# Patient Record
Sex: FEMALE | Race: WHITE | NOT HISPANIC OR LATINO | Employment: FULL TIME | URBAN - METROPOLITAN AREA
[De-identification: names, ages, dates, MRNs, and addresses within clinical notes are randomized per-mention and may not be internally consistent; named-entity substitution may affect disease eponyms.]

---

## 2022-11-29 ENCOUNTER — TRANSFERRED RECORDS (OUTPATIENT)
Dept: HEALTH INFORMATION MANAGEMENT | Facility: CLINIC | Age: 32
End: 2022-11-29

## 2022-11-30 ENCOUNTER — MEDICAL CORRESPONDENCE (OUTPATIENT)
Dept: HEALTH INFORMATION MANAGEMENT | Facility: CLINIC | Age: 32
End: 2022-11-30

## 2022-12-08 ENCOUNTER — TRANSCRIBE ORDERS (OUTPATIENT)
Dept: MATERNAL FETAL MEDICINE | Facility: CLINIC | Age: 32
End: 2022-12-08

## 2022-12-08 DIAGNOSIS — O26.90 PREGNANCY RELATED CONDITION, ANTEPARTUM: Primary | ICD-10-CM

## 2023-01-27 ENCOUNTER — PRE VISIT (OUTPATIENT)
Dept: MATERNAL FETAL MEDICINE | Facility: HOSPITAL | Age: 33
End: 2023-01-27

## 2023-02-01 ENCOUNTER — OFFICE VISIT (OUTPATIENT)
Dept: MATERNAL FETAL MEDICINE | Facility: HOSPITAL | Age: 33
End: 2023-02-01
Attending: MIDWIFE
Payer: COMMERCIAL

## 2023-02-01 ENCOUNTER — ANCILLARY PROCEDURE (OUTPATIENT)
Dept: ULTRASOUND IMAGING | Facility: HOSPITAL | Age: 33
End: 2023-02-01
Attending: MIDWIFE
Payer: COMMERCIAL

## 2023-02-01 DIAGNOSIS — O26.90 PREGNANCY RELATED CONDITION, ANTEPARTUM: ICD-10-CM

## 2023-02-01 DIAGNOSIS — O99.212 MATERNAL OBESITY SYNDROME, ANTEPARTUM, SECOND TRIMESTER: Primary | ICD-10-CM

## 2023-02-01 PROCEDURE — 99207 PR NO CHARGE LOS: CPT | Performed by: OBSTETRICS & GYNECOLOGY

## 2023-02-01 PROCEDURE — 76811 OB US DETAILED SNGL FETUS: CPT | Mod: 26 | Performed by: OBSTETRICS & GYNECOLOGY

## 2023-02-01 PROCEDURE — 76811 OB US DETAILED SNGL FETUS: CPT

## 2023-02-01 NOTE — NURSING NOTE
Patient reports no pain, no contractions, leaking of fluid, or bleeding.  SBAR given to AMARA BURNHAM, see their note in Epic.

## 2023-02-01 NOTE — PROGRESS NOTES
Please see full imaging report from [facility] program under imaging tab.    Margie Munson MD  Maternal Fetal Medicine

## 2023-03-01 ENCOUNTER — ANCILLARY PROCEDURE (OUTPATIENT)
Dept: ULTRASOUND IMAGING | Facility: HOSPITAL | Age: 33
End: 2023-03-01
Attending: OBSTETRICS & GYNECOLOGY
Payer: COMMERCIAL

## 2023-03-01 ENCOUNTER — OFFICE VISIT (OUTPATIENT)
Dept: MATERNAL FETAL MEDICINE | Facility: HOSPITAL | Age: 33
End: 2023-03-01
Attending: OBSTETRICS & GYNECOLOGY
Payer: COMMERCIAL

## 2023-03-01 DIAGNOSIS — O99.212 MATERNAL OBESITY SYNDROME, ANTEPARTUM, SECOND TRIMESTER: ICD-10-CM

## 2023-03-01 DIAGNOSIS — O99.212 MATERNAL OBESITY SYNDROME, ANTEPARTUM, SECOND TRIMESTER: Primary | ICD-10-CM

## 2023-03-01 PROCEDURE — 99207 PR NO CHARGE LOS: CPT | Performed by: OBSTETRICS & GYNECOLOGY

## 2023-03-01 PROCEDURE — 76816 OB US FOLLOW-UP PER FETUS: CPT | Mod: 26 | Performed by: OBSTETRICS & GYNECOLOGY

## 2023-03-01 PROCEDURE — 76816 OB US FOLLOW-UP PER FETUS: CPT

## 2023-03-01 NOTE — PROGRESS NOTES
Please see full imaging report from [facility] program under imaging tab    Margie Munson MD  Maternal Fetal Medicine

## 2023-04-11 LAB
ABO (EXTERNAL): NORMAL
ABO (EXTERNAL): NORMAL
GROUP B STREPTOCOCCUS (EXTERNAL): NEGATIVE
HEMOGLOBIN (EXTERNAL): 11.8 G/DL (ref 11.7–15.5)
HEMOGLOBIN (EXTERNAL): 11.8 G/DL (ref 11.7–15.5)
HEPATITIS B SURFACE ANTIGEN (EXTERNAL): NONREACTIVE
HIV1+2 AB SERPL QL IA: NONREACTIVE
HIV1+2 AB SERPL QL IA: NONREACTIVE
PLATELET COUNT (EXTERNAL): 347 10E3/UL (ref 140–400)
RH (EXTERNAL): POSITIVE
RH (EXTERNAL): POSITIVE
RUBELLA ANTIBODY IGG (EXTERNAL): NORMAL

## 2023-04-12 ENCOUNTER — HOSPITAL ENCOUNTER (OUTPATIENT)
Facility: HOSPITAL | Age: 33
Discharge: HOME | End: 2023-04-12
Attending: ADVANCED PRACTICE MIDWIFE | Admitting: ADVANCED PRACTICE MIDWIFE
Payer: COMMERCIAL

## 2023-04-12 ENCOUNTER — HOSPITAL ENCOUNTER (OUTPATIENT)
Facility: HOSPITAL | Age: 33
End: 2023-04-12
Admitting: ADVANCED PRACTICE MIDWIFE
Payer: COMMERCIAL

## 2023-04-12 VITALS
DIASTOLIC BLOOD PRESSURE: 69 MMHG | RESPIRATION RATE: 18 BRPM | TEMPERATURE: 98 F | HEIGHT: 68 IN | BODY MASS INDEX: 44.41 KG/M2 | HEART RATE: 100 BPM | SYSTOLIC BLOOD PRESSURE: 131 MMHG | WEIGHT: 293 LBS

## 2023-04-12 PROBLEM — Z36.89 ENCOUNTER FOR TRIAGE IN PREGNANT PATIENT: Status: ACTIVE | Noted: 2023-04-12

## 2023-04-12 PROCEDURE — G0463 HOSPITAL OUTPT CLINIC VISIT: HCPCS

## 2023-04-12 RX ORDER — PRENATAL VIT/IRON FUM/FOLIC AC 27MG-0.8MG
1 TABLET ORAL DAILY
COMMUNITY
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
## 2023-04-12 NOTE — DISCHARGE INSTRUCTIONS
Discharge Instruction for Undelivered Patients    Call your provider if you notice:  Swelling in your face or increased swelling in your hands or legs.  Headaches that are not relieved by Tylenol (acetaminophen).  Changes in your vision (blurring: seeing spots or stars.)  Nausea (sick to your stomach) and vomiting (throwing up).   Weight gain of 5 pounds or more per week.  Heartburn that doesn't go away.  Signs of bladder infection: pain when you urinate (use the toilet), need to go more often and more urgently.  The bag of wilson (rupture of membranes) breaks, or you notice leaking in your underwear.  Bright red blood in your underwear.  Abdominal (lower belly) or stomach pain.  For first baby: Contractions (tightening) less than 5 minutes apart for one hour or more.  Second (plus) baby: Contractions (tightening) less than 10 minutes apart and getting stronger.  *If less than 34 weeks: Contractions (tightening) more than 6 times in one hour.  Increase or change in vaginal discharge (note the color and amount)      Follow-up:  Scheduled appointment at [facility].       Kick Counts  It s normal to worry about your baby s health. One way you can know your baby s doing well is to record the baby s movements once a day. This is called a kick count.   Remember to take your kick count records to all your appointments with your healthcare provider.  How to count kicks    Time how long it takes you to feel 10 kicks, flutters, swishes, or rolls. Ideally, you want to feel at least 10 movements in 2 hours. You will likely feel 10 movements in less time than that.  Starting at 28 weeks, count your baby's movements daily. Follow your healthcare provider's instructions for kick counting. Here are tips for counting kicks:  Choose a time when the baby is active, such as after a meal.   Sit comfortably or lie on your side.   The first time the baby moves, write down the time.   Count each movement until the baby has moved  10 times. This  can take from 20 minutes to 2 hours.   If you haven't felt 10 kicks by the end of the second hour, wait a few hours. Then try again.  Try to do it at the same time each day.  When to call your healthcare provider  Call your healthcare provider  right away if:  You do a couple sets of kick counts during the day and your baby moves fewer than 10 times in 2 hours.  Your baby moves much less often than on the days before.  You haven't felt your baby move all day.  [employer] last reviewed this educational content on 8/1/2020 2000-2022 [employer]. All rights reserved. This information is not intended as a substitute for professional medical care. Always follow your healthcare professional's instructions.

## 2023-04-12 NOTE — PROGRESS NOTES
Patient given discharge paperwork and instructed to follow up with PHCP at scheduled appointment. Pt instructed to return to hospital if any vaginal bleeding or leaking of fluids, decreased fetal movement, or contractions. Pt verbalizes understanding.

## 2023-04-12 NOTE — PROGRESS NOTES
Jean Claude Peña CNM called and notified of pt arrival and status. Orders to continue monitoring for 10 more minutes and then patient can be discharged home.

## 2023-05-21 ENCOUNTER — HEALTH MAINTENANCE LETTER (OUTPATIENT)
Age: 33
End: 2023-05-21

## 2023-06-06 LAB — GROUP B STREPTOCOCCUS (EXTERNAL): NEGATIVE

## 2023-06-27 ENCOUNTER — HOSPITAL ENCOUNTER (OUTPATIENT)
Facility: HOSPITAL | Age: 33
Discharge: HOME | End: 2023-06-27
Attending: ADVANCED PRACTICE MIDWIFE | Admitting: ADVANCED PRACTICE MIDWIFE
Payer: COMMERCIAL

## 2023-06-27 VITALS
WEIGHT: 293 LBS | BODY MASS INDEX: 44.41 KG/M2 | DIASTOLIC BLOOD PRESSURE: 80 MMHG | HEIGHT: 68 IN | TEMPERATURE: 98.2 F | RESPIRATION RATE: 18 BRPM | SYSTOLIC BLOOD PRESSURE: 140 MMHG

## 2023-06-27 DIAGNOSIS — Z36.89 ENCOUNTER FOR TRIAGE IN PREGNANT PATIENT: Primary | ICD-10-CM

## 2023-06-27 LAB
ALBUMIN MFR UR ELPH: <4 MG/DL
ALT SERPL W P-5'-P-CCNC: 12 U/L (ref 0–50)
AST SERPL W P-5'-P-CCNC: 19 U/L (ref 0–45)
CREAT SERPL-MCNC: 0.57 MG/DL (ref 0.51–0.95)
CREAT UR-MCNC: 33.8 MG/DL
ERYTHROCYTE [DISTWIDTH] IN BLOOD BY AUTOMATED COUNT: 13.9 % (ref 10–15)
GFR SERPL CREATININE-BSD FRML MDRD: >90 ML/MIN/1.73M2
HCT VFR BLD AUTO: 31 % (ref 35–47)
HGB BLD-MCNC: 10.3 G/DL (ref 11.7–15.7)
HOLD SPECIMEN: NORMAL
MCH RBC QN AUTO: 27 PG (ref 26.5–33)
MCHC RBC AUTO-ENTMCNC: 33.2 G/DL (ref 31.5–36.5)
MCV RBC AUTO: 81 FL (ref 78–100)
PLATELET # BLD AUTO: 260 10E3/UL (ref 150–450)
PROT/CREAT 24H UR: NORMAL MG/G{CREAT}
RBC # BLD AUTO: 3.81 10E6/UL (ref 3.8–5.2)
WBC # BLD AUTO: 10.2 10E3/UL (ref 4–11)

## 2023-06-27 PROCEDURE — 84156 ASSAY OF PROTEIN URINE: CPT | Performed by: ADVANCED PRACTICE MIDWIFE

## 2023-06-27 PROCEDURE — 36415 COLL VENOUS BLD VENIPUNCTURE: CPT | Performed by: ADVANCED PRACTICE MIDWIFE

## 2023-06-27 PROCEDURE — 84450 TRANSFERASE (AST) (SGOT): CPT | Performed by: ADVANCED PRACTICE MIDWIFE

## 2023-06-27 PROCEDURE — G0463 HOSPITAL OUTPT CLINIC VISIT: HCPCS

## 2023-06-27 PROCEDURE — 82565 ASSAY OF CREATININE: CPT | Performed by: ADVANCED PRACTICE MIDWIFE

## 2023-06-27 PROCEDURE — 85027 COMPLETE CBC AUTOMATED: CPT | Performed by: ADVANCED PRACTICE MIDWIFE

## 2023-06-27 PROCEDURE — 84460 ALANINE AMINO (ALT) (SGPT): CPT | Performed by: ADVANCED PRACTICE MIDWIFE

## 2023-06-27 RX ORDER — LIDOCAINE 40 MG/G
CREAM TOPICAL
Status: DISCONTINUED | OUTPATIENT
Start: 2023-06-27 | End: 2023-06-27 | Stop reason: HOSPADM

## 2023-06-27 ASSESSMENT — ACTIVITIES OF DAILY LIVING (ADL): ADLS_ACUITY_SCORE: 31

## 2023-06-27 NOTE — PROGRESS NOTES
Patient is agreeable to planned induction due to elevated BMI and likely gestational hypertension (elevated blood pressures 6/27 AM). Patient given option to return for blood pressure check in clinic, patient prefers to schedule induction and have the plan set up while in hospital today. Aracelis MONAE CNM, agreeable to this plan. Induction offered tonight or Thursday AM. Patient and spouse prefer to come Thursday. Induction scheduled with [facility]. Blood pressure cuff ordered and given to patient to monitor blood pressures until induction. RN reviewed education with patient and observed patient taking her own blood pressure. RN reviewed discharge instructions, who and when to call, and pre-eclampsia warning signs. Patient and spouse verbalized understanding. Discharged home 12

## 2023-06-27 NOTE — DISCHARGE INSTRUCTIONS
Discharge Instruction for Undelivered Patients      Call your provider if you notice:  Swelling in your face or increased swelling in your hands or legs.  Headaches that are not relieved by Tylenol (acetaminophen).  Changes in your vision (blurring: seeing spots or stars.)  Nausea (sick to your stomach) and vomiting (throwing up).   Weight gain of 5 pounds or more per week.  Heartburn that doesn't go away.  Signs of bladder infection: pain when you urinate (use the toilet), need to go more often and more urgently.  The bag of wilson (rupture of membranes) breaks, or you notice leaking in your underwear.  Bright red blood in your underwear.  Abdominal (lower belly) or stomach pain.  For first baby: Contractions (tightening) less than 5 minutes apart for one hour or more.  Second (plus) baby: Contractions (tightening) less than 10 minutes apart and getting stronger.  *If less than 34 weeks: Contractions (tightening) more than 6 times in one hour.  Increase or change in vaginal discharge (note the color and amount)      Follow-up:  Call [facility] at 6AM on Thursday 6/29 to ensure induction arrival time   Come to [facility] on Thursday 6/29 at 7:30AM for scheduled induction    Call Provider if Blood pressure is above 150/90 two times at least 15 minutes apart

## 2023-06-27 NOTE — PROGRESS NOTES
"Outpatient/Triage Note:    Patient Name:  Susanna Fraser  :      1990  MRN:      [id number]    Subjective:  Susanna Fraser is a 32 year old  at 39.0 weeks, who presented to [facility] for evaluation of elevated clinic BP. Denies leaking of fluid, bleeding, or changes in fetal movement. Does endorse mild headache last evening for which she took tylenol for.     Objective:  Vital signs: BP (!) 140/80   Temp 98.2  F (36.8  C) (Oral)   Resp 18   Ht 1.727 m (5' 8\")   Wt (!) 152 kg (335 lb)   LMP  (LMP Unknown)   BMI 50.94 kg/m    FHR: 1 (difficult to trace due to maternal body habitus)  Uterine contractions: irritability  NST reactive    Physical Exam: A&Ox3  Abdomen: SIUP, Vertex by Leopold's, abdomen non-tender  SVE: /-2 per R  Legs: +1 edema, moves freely    Results:  Recent Results (from the past 168 hour(s))   ALT   Result Value Ref Range Status    ALT 12 0 - 50 U/L Final   AST   Result Value Ref Range Status    AST 19 0 - 45 U/L Final   CBC with platelets   Result Value Ref Range Status    WBC Count 10.2 4.0 - 11.0 10e3/uL Final    RBC Count 3.81 3.80 - 5.20 10e6/uL Final    Hemoglobin 10.3 (L) 11.7 - 15.7 g/dL Final    Hematocrit 31.0 (L) 35.0 - 47.0 % Final    MCV 81 78 - 100 fL Final    MCH 27.0 26.5 - 33.0 pg Final    MCHC 33.2 31.5 - 36.5 g/dL Final    RDW 13.9 10.0 - 15.0 % Final    Platelet Count 260 150 - 450 10e3/uL Final   Creatinine   Result Value Ref Range Status    Creatinine 0.57 0.51 - 0.95 mg/dL Final    GFR Estimate >90 >60 mL/min/1.73m2 Final   Protein  random urine   Result Value Ref Range Status    Total Protein Urine mg/dL <4.0   mg/dL Final    Total Protein UR MG/MG CR   Final    Creatinine Urine mg/dL 33.8 mg/dL Final     *Note: Due to a large number of results and/or encounters for the requested time period, some results have not been displayed. A complete set of results can be found in Results Review.       Assessment:   @ 39w0d here for evaluation of " elevated clinic BP.     Plan:   -Discussed risks of HTN including rapid progression to severe range BP's. Discussed risk of stroke, seizure, fetal or maternal death. She expresses understanding to these risks.  -Declines recommendation to stay for IOL at this time. Agreeable to return for IOL so she can mentally prepare. Scheduled 6/29/23 at 0730 at [facility]. On call providers notified.   -HTN warning signs discussed.   -Sent home with BP cuff and to call with elevated BP's.       Provider: PRISCILA Leo CNM

## 2023-06-27 NOTE — PROGRESS NOTES
Data: [facility]: 2023  9:43 AM.  Reason for maternal/fetal assessment is elevated blood pressures. Patient reports elevated BP in clinic today, moderate edema in lower extremities and mild generalized edema, reports occasional headaches.  Patient is a .  Prenatal record reviewed. Pregnancy  has been complicated by has been uncomplicated.  Gestational Age 39w0d. VSS. Fetal movement present. Patient denies uterine contractions, leaking of vaginal fluid/rupture of membranes, visual disturbances, epigastric or RUQ pain. Support person is present.   Action: Verbal consent for EFM. Triage assessment completed. Bill of rights reviewed.  Response: Patient verbalized agreement with plan. Will contact Dr Aracelis Hernandes with update and further orders.

## 2023-07-01 ENCOUNTER — HOSPITAL ENCOUNTER (INPATIENT)
Facility: HOSPITAL | Age: 33
LOS: 2 days | Discharge: HOME | DRG: 833 | End: 2023-07-03
Attending: ADVANCED PRACTICE MIDWIFE | Admitting: ADVANCED PRACTICE MIDWIFE
Payer: COMMERCIAL

## 2023-07-01 PROBLEM — Z34.90 ENCOUNTER FOR INDUCTION OF LABOR: Status: ACTIVE | Noted: 2023-07-01

## 2023-07-01 LAB
ABO/RH(D): NORMAL
ALBUMIN MFR UR ELPH: 14.8 MG/DL
ALBUMIN SERPL BCG-MCNC: 3.4 G/DL (ref 3.5–5.2)
ALP SERPL-CCNC: 135 U/L (ref 35–104)
ALT SERPL W P-5'-P-CCNC: 12 U/L (ref 0–50)
ANION GAP SERPL CALCULATED.3IONS-SCNC: 11 MMOL/L (ref 7–15)
ANTIBODY SCREEN: NEGATIVE
AST SERPL W P-5'-P-CCNC: 19 U/L (ref 0–45)
BILIRUB SERPL-MCNC: <0.2 MG/DL
BUN SERPL-MCNC: 7 MG/DL (ref 6–20)
CALCIUM SERPL-MCNC: 9.7 MG/DL (ref 8.6–10)
CHLORIDE SERPL-SCNC: 106 MMOL/L (ref 98–107)
CREAT SERPL-MCNC: 0.59 MG/DL (ref 0.51–0.95)
CREAT UR-MCNC: 153.5 MG/DL
DEPRECATED HCO3 PLAS-SCNC: 21 MMOL/L (ref 22–29)
ERYTHROCYTE [DISTWIDTH] IN BLOOD BY AUTOMATED COUNT: 14 % (ref 10–15)
GFR SERPL CREATININE-BSD FRML MDRD: >90 ML/MIN/1.73M2
GLUCOSE SERPL-MCNC: 105 MG/DL (ref 70–99)
HCT VFR BLD AUTO: 31.1 % (ref 35–47)
HGB BLD-MCNC: 10.4 G/DL (ref 11.7–15.7)
HOLD SPECIMEN: NORMAL
MCH RBC QN AUTO: 27.2 PG (ref 26.5–33)
MCHC RBC AUTO-ENTMCNC: 33.4 G/DL (ref 31.5–36.5)
MCV RBC AUTO: 81 FL (ref 78–100)
PLATELET # BLD AUTO: 281 10E3/UL (ref 150–450)
POTASSIUM SERPL-SCNC: 4.3 MMOL/L (ref 3.4–5.3)
PROT SERPL-MCNC: 6.2 G/DL (ref 6.4–8.3)
PROT/CREAT 24H UR: 0.1 MG/MG CR (ref 0–0.2)
RBC # BLD AUTO: 3.83 10E6/UL (ref 3.8–5.2)
SODIUM SERPL-SCNC: 138 MMOL/L (ref 136–145)
SPECIMEN EXPIRATION DATE: NORMAL
WBC # BLD AUTO: 10.1 10E3/UL (ref 4–11)

## 2023-07-01 PROCEDURE — 86850 RBC ANTIBODY SCREEN: CPT | Performed by: ADVANCED PRACTICE MIDWIFE

## 2023-07-01 PROCEDURE — 80053 COMPREHEN METABOLIC PANEL: CPT | Performed by: ADVANCED PRACTICE MIDWIFE

## 2023-07-01 PROCEDURE — 250N000013 HC RX MED GY IP 250 OP 250 PS 637: Performed by: ADVANCED PRACTICE MIDWIFE

## 2023-07-01 PROCEDURE — 3E0P7GC INTRODUCTION OF OTHER THERAPEUTIC SUBSTANCE INTO FEMALE REPRODUCTIVE, VIA NATURAL OR ARTIFICIAL OPENING: ICD-10-PCS | Performed by: ADVANCED PRACTICE MIDWIFE

## 2023-07-01 PROCEDURE — 120N000001 HC R&B MED SURG/OB

## 2023-07-01 PROCEDURE — 36415 COLL VENOUS BLD VENIPUNCTURE: CPT | Performed by: ADVANCED PRACTICE MIDWIFE

## 2023-07-01 PROCEDURE — 86780 TREPONEMA PALLIDUM: CPT | Performed by: ADVANCED PRACTICE MIDWIFE

## 2023-07-01 PROCEDURE — 85027 COMPLETE CBC AUTOMATED: CPT | Performed by: ADVANCED PRACTICE MIDWIFE

## 2023-07-01 PROCEDURE — 86901 BLOOD TYPING SEROLOGIC RH(D): CPT | Performed by: ADVANCED PRACTICE MIDWIFE

## 2023-07-01 PROCEDURE — 84156 ASSAY OF PROTEIN URINE: CPT | Performed by: ADVANCED PRACTICE MIDWIFE

## 2023-07-01 RX ORDER — NALOXONE HYDROCHLORIDE 0.4 MG/ML
0.4 INJECTION, SOLUTION INTRAMUSCULAR; INTRAVENOUS; SUBCUTANEOUS
Status: DISCONTINUED | OUTPATIENT
Start: 2023-07-01 | End: 2023-07-03 | Stop reason: HOSPADM

## 2023-07-01 RX ORDER — FENTANYL CITRATE 50 UG/ML
50 INJECTION, SOLUTION INTRAMUSCULAR; INTRAVENOUS EVERY 30 MIN PRN
Status: DISCONTINUED | OUTPATIENT
Start: 2023-07-01 | End: 2023-07-03 | Stop reason: HOSPADM

## 2023-07-01 RX ORDER — NALOXONE HYDROCHLORIDE 0.4 MG/ML
0.2 INJECTION, SOLUTION INTRAMUSCULAR; INTRAVENOUS; SUBCUTANEOUS
Status: DISCONTINUED | OUTPATIENT
Start: 2023-07-01 | End: 2023-07-03 | Stop reason: HOSPADM

## 2023-07-01 RX ORDER — ACETAMINOPHEN 325 MG/1
650 TABLET ORAL EVERY 4 HOURS PRN
Status: DISCONTINUED | OUTPATIENT
Start: 2023-07-01 | End: 2023-07-03 | Stop reason: HOSPADM

## 2023-07-01 RX ORDER — OXYTOCIN/0.9 % SODIUM CHLORIDE 30/500 ML
340 PLASTIC BAG, INJECTION (ML) INTRAVENOUS CONTINUOUS PRN
Status: DISCONTINUED | OUTPATIENT
Start: 2023-07-01 | End: 2023-07-03 | Stop reason: HOSPADM

## 2023-07-01 RX ORDER — METOCLOPRAMIDE HYDROCHLORIDE 5 MG/ML
10 INJECTION INTRAMUSCULAR; INTRAVENOUS EVERY 6 HOURS PRN
Status: DISCONTINUED | OUTPATIENT
Start: 2023-07-01 | End: 2023-07-03 | Stop reason: HOSPADM

## 2023-07-01 RX ORDER — KETOROLAC TROMETHAMINE 30 MG/ML
30 INJECTION, SOLUTION INTRAMUSCULAR; INTRAVENOUS
Status: DISCONTINUED | OUTPATIENT
Start: 2023-07-01 | End: 2023-07-03 | Stop reason: HOSPADM

## 2023-07-01 RX ORDER — IBUPROFEN 800 MG/1
800 TABLET, FILM COATED ORAL
Status: DISCONTINUED | OUTPATIENT
Start: 2023-07-01 | End: 2023-07-03 | Stop reason: HOSPADM

## 2023-07-01 RX ORDER — PROCHLORPERAZINE 25 MG
25 SUPPOSITORY, RECTAL RECTAL EVERY 12 HOURS PRN
Status: DISCONTINUED | OUTPATIENT
Start: 2023-07-01 | End: 2023-07-03 | Stop reason: HOSPADM

## 2023-07-01 RX ORDER — OXYCODONE HYDROCHLORIDE 5 MG/1
5 TABLET ORAL
Status: DISCONTINUED | OUTPATIENT
Start: 2023-07-01 | End: 2023-07-03 | Stop reason: HOSPADM

## 2023-07-01 RX ORDER — MISOPROSTOL 100 UG/1
25 TABLET ORAL
Status: DISCONTINUED | OUTPATIENT
Start: 2023-07-01 | End: 2023-07-02

## 2023-07-01 RX ORDER — LABETALOL HYDROCHLORIDE 5 MG/ML
20-80 INJECTION, SOLUTION INTRAVENOUS EVERY 10 MIN PRN
Status: DISCONTINUED | OUTPATIENT
Start: 2023-07-01 | End: 2023-07-03 | Stop reason: HOSPADM

## 2023-07-01 RX ORDER — PROCHLORPERAZINE MALEATE 10 MG
10 TABLET ORAL EVERY 6 HOURS PRN
Status: DISCONTINUED | OUTPATIENT
Start: 2023-07-01 | End: 2023-07-03 | Stop reason: HOSPADM

## 2023-07-01 RX ORDER — MORPHINE SULFATE 10 MG/ML
10 INJECTION, SOLUTION INTRAMUSCULAR; INTRAVENOUS
Status: DISCONTINUED | OUTPATIENT
Start: 2023-07-01 | End: 2023-07-02

## 2023-07-01 RX ORDER — ONDANSETRON 2 MG/ML
4 INJECTION INTRAMUSCULAR; INTRAVENOUS EVERY 6 HOURS PRN
Status: DISCONTINUED | OUTPATIENT
Start: 2023-07-01 | End: 2023-07-03 | Stop reason: HOSPADM

## 2023-07-01 RX ORDER — ONDANSETRON 4 MG/1
4 TABLET, ORALLY DISINTEGRATING ORAL EVERY 6 HOURS PRN
Status: DISCONTINUED | OUTPATIENT
Start: 2023-07-01 | End: 2023-07-03 | Stop reason: HOSPADM

## 2023-07-01 RX ORDER — METOCLOPRAMIDE 10 MG/1
10 TABLET ORAL EVERY 6 HOURS PRN
Status: DISCONTINUED | OUTPATIENT
Start: 2023-07-01 | End: 2023-07-03 | Stop reason: HOSPADM

## 2023-07-01 RX ORDER — MISOPROSTOL 200 UG/1
400 TABLET ORAL
Status: DISCONTINUED | OUTPATIENT
Start: 2023-07-01 | End: 2023-07-03 | Stop reason: HOSPADM

## 2023-07-01 RX ORDER — OXYTOCIN 10 [USP'U]/ML
10 INJECTION, SOLUTION INTRAMUSCULAR; INTRAVENOUS
Status: DISCONTINUED | OUTPATIENT
Start: 2023-07-01 | End: 2023-07-03 | Stop reason: HOSPADM

## 2023-07-01 RX ORDER — HYDRALAZINE HYDROCHLORIDE 20 MG/ML
10 INJECTION INTRAMUSCULAR; INTRAVENOUS
Status: DISCONTINUED | OUTPATIENT
Start: 2023-07-01 | End: 2023-07-03 | Stop reason: HOSPADM

## 2023-07-01 RX ORDER — OXYTOCIN/0.9 % SODIUM CHLORIDE 30/500 ML
100-340 PLASTIC BAG, INJECTION (ML) INTRAVENOUS CONTINUOUS PRN
Status: DISCONTINUED | OUTPATIENT
Start: 2023-07-01 | End: 2023-07-03 | Stop reason: HOSPADM

## 2023-07-01 RX ORDER — METHYLERGONOVINE MALEATE 0.2 MG/ML
200 INJECTION INTRAVENOUS
Status: DISCONTINUED | OUTPATIENT
Start: 2023-07-01 | End: 2023-07-03 | Stop reason: HOSPADM

## 2023-07-01 RX ORDER — CARBOPROST TROMETHAMINE 250 UG/ML
250 INJECTION, SOLUTION INTRAMUSCULAR
Status: DISCONTINUED | OUTPATIENT
Start: 2023-07-01 | End: 2023-07-03 | Stop reason: HOSPADM

## 2023-07-01 RX ORDER — CITRIC ACID/SODIUM CITRATE 334-500MG
30 SOLUTION, ORAL ORAL
Status: DISCONTINUED | OUTPATIENT
Start: 2023-07-01 | End: 2023-07-03 | Stop reason: HOSPADM

## 2023-07-01 RX ORDER — MISOPROSTOL 200 UG/1
800 TABLET ORAL
Status: DISCONTINUED | OUTPATIENT
Start: 2023-07-01 | End: 2023-07-03 | Stop reason: HOSPADM

## 2023-07-01 RX ORDER — HYDROXYZINE HYDROCHLORIDE 50 MG/1
50 TABLET, FILM COATED ORAL
Status: DISCONTINUED | OUTPATIENT
Start: 2023-07-01 | End: 2023-07-02

## 2023-07-01 RX ORDER — TERBUTALINE SULFATE 1 MG/ML
0.25 INJECTION, SOLUTION SUBCUTANEOUS
Status: DISCONTINUED | OUTPATIENT
Start: 2023-07-01 | End: 2023-07-02

## 2023-07-01 RX ADMIN — Medication 25 MCG: at 18:32

## 2023-07-01 RX ADMIN — DINOPROSTONE 10 MG: 10 INSERT VAGINAL at 20:46

## 2023-07-01 RX ADMIN — Medication 25 MCG: at 16:33

## 2023-07-01 ASSESSMENT — ACTIVITIES OF DAILY LIVING (ADL)
DIFFICULTY_EATING/SWALLOWING: NO
TOILETING_ISSUES: NO
CONCENTRATING,_REMEMBERING_OR_MAKING_DECISIONS_DIFFICULTY: NO
ADLS_ACUITY_SCORE: 20
WALKING_OR_CLIMBING_STAIRS_DIFFICULTY: NO
DRESSING/BATHING_DIFFICULTY: NO
ADLS_ACUITY_SCORE: 20
DOING_ERRANDS_INDEPENDENTLY_DIFFICULTY: NO
VISION_MANAGEMENT: CONTACTS
CHANGE_IN_FUNCTIONAL_STATUS_SINCE_ONSET_OF_CURRENT_ILLNESS/INJURY: NO
ADLS_ACUITY_SCORE: 20
ADLS_ACUITY_SCORE: 20
ADLS_ACUITY_SCORE: 35
FALL_HISTORY_WITHIN_LAST_SIX_MONTHS: NO
WEAR_GLASSES_OR_BLIND: YES

## 2023-07-01 NOTE — PROGRESS NOTES
Data: Patient presented to [facility]: 2023  3:01 PM.  Patient admitted for induction for gestational hypertension and elevated BMI. Patient is a .  Prenatal record reviewed. Pregnancy  has been complicated by gestational hypertension.  Gestational Age 39w4d. VSS. Fetal movement present. Patient denies uterine contractions, leaking of vaginal fluid/rupture of membranes, vaginal bleeding, headache, visual disturbances, epigastric or URQ pain. Support person is present.   Action: Verbal consent for EFM.  Admission assessment completed. Bill of rights reviewed.  Response: Patient verbalized agreement with plan. Will contact Dr Jean Claude Peña with update and further orders.

## 2023-07-01 NOTE — H&P
"HISTORY AND PHYSICAL UPDATE ADMISSION EXAM    Name: Susanna Fraser  YOB: 1990  Medical Record Number: [id number]    History of Present Illness: Susanna Fraser is a 32 year old female who is 39w4d pregnant and being admitted for induction of labor for GHTN and elevated BMI. She has had early and consistent prenatal care. Reassuring weekly  testing since 34 weeks. EFW at 36 weeks was in the 84% at 7-0 lbs. She has GHTN with BP elevations in the late third trimester, labs have been normal.   Estimated Date of Delivery: 2023    EGA 39w4d    OB History    Para Term  AB Living   1 0 0 0 0 0   SAB IAB Ectopic Multiple Live Births   0 0 0 0 0      # Outcome Date GA Lbr Deonte/2nd Weight Sex Delivery Anes PTL Lv   1 Current                 Lab Results   Component Value Date    HGB 10.3 (L) 2023       Prenatal Complications:  1. BMI 47  2. GHTN  3. Hx of depression; un-medicated and stable  4. Prominent fetal renal pelvis R 7.8, L 7.3    Exam:      /79   Temp 99.6  F (37.6  C) (Oral)   Resp 18   Ht 1.727 m (5' 8\")   Wt (!) 152 kg (335 lb)   LMP  (LMP Unknown)   BMI 50.94 kg/m      Fetal heart Rate Category1  Contractions rare    HEENT grossly normal  Neck: no lymphadenopathy or thryoidomegaly    ABD gravid, non-tender  EXT:  moves freely  Vaginal exam /- per RN  Membranes: intact    Assessment: @39.4 weeks with IOL for BMI and GHTN    Plan: Admit for cervical ripening  GHTN order set placed and labs ordered  Sleep medication if requested overnight  Anticipate NSVB    Prenatal record reviewed.    Dr. BISWAS aware of patient status and remains available for consultation and collaboration as needed.      Jean Claude Peña CNM      2023   4:26 PM  "

## 2023-07-02 LAB — T PALLIDUM AB SER QL: NONREACTIVE

## 2023-07-02 PROCEDURE — 120N000001 HC R&B MED SURG/OB

## 2023-07-02 PROCEDURE — 258N000003 HC RX IP 258 OP 636: Performed by: ADVANCED PRACTICE MIDWIFE

## 2023-07-02 PROCEDURE — 3E033VJ INTRODUCTION OF OTHER HORMONE INTO PERIPHERAL VEIN, PERCUTANEOUS APPROACH: ICD-10-PCS | Performed by: ADVANCED PRACTICE MIDWIFE

## 2023-07-02 PROCEDURE — 250N000013 HC RX MED GY IP 250 OP 250 PS 637: Performed by: ADVANCED PRACTICE MIDWIFE

## 2023-07-02 PROCEDURE — 250N000009 HC RX 250: Performed by: ADVANCED PRACTICE MIDWIFE

## 2023-07-02 RX ORDER — OXYTOCIN/0.9 % SODIUM CHLORIDE 30/500 ML
1-24 PLASTIC BAG, INJECTION (ML) INTRAVENOUS CONTINUOUS
Status: DISCONTINUED | OUTPATIENT
Start: 2023-07-02 | End: 2023-07-02

## 2023-07-02 RX ORDER — TERBUTALINE SULFATE 1 MG/ML
0.25 INJECTION, SOLUTION SUBCUTANEOUS
Status: DISCONTINUED | OUTPATIENT
Start: 2023-07-02 | End: 2023-07-03 | Stop reason: HOSPADM

## 2023-07-02 RX ORDER — LIDOCAINE 40 MG/G
CREAM TOPICAL
Status: DISCONTINUED | OUTPATIENT
Start: 2023-07-02 | End: 2023-07-02

## 2023-07-02 RX ORDER — TERBUTALINE SULFATE 1 MG/ML
0.25 INJECTION, SOLUTION SUBCUTANEOUS
Status: DISCONTINUED | OUTPATIENT
Start: 2023-07-02 | End: 2023-07-02

## 2023-07-02 RX ORDER — SODIUM CHLORIDE, SODIUM LACTATE, POTASSIUM CHLORIDE, CALCIUM CHLORIDE 600; 310; 30; 20 MG/100ML; MG/100ML; MG/100ML; MG/100ML
INJECTION, SOLUTION INTRAVENOUS CONTINUOUS PRN
Status: DISCONTINUED | OUTPATIENT
Start: 2023-07-02 | End: 2023-07-02

## 2023-07-02 RX ORDER — HYDROXYZINE HYDROCHLORIDE 50 MG/1
100 TABLET, FILM COATED ORAL
Status: DISCONTINUED | OUTPATIENT
Start: 2023-07-02 | End: 2023-07-03 | Stop reason: HOSPADM

## 2023-07-02 RX ORDER — MORPHINE SULFATE 10 MG/ML
10 INJECTION, SOLUTION INTRAMUSCULAR; INTRAVENOUS
Status: DISCONTINUED | OUTPATIENT
Start: 2023-07-02 | End: 2023-07-03 | Stop reason: HOSPADM

## 2023-07-02 RX ADMIN — SODIUM CHLORIDE, POTASSIUM CHLORIDE, SODIUM LACTATE AND CALCIUM CHLORIDE: 600; 310; 30; 20 INJECTION, SOLUTION INTRAVENOUS at 09:45

## 2023-07-02 RX ADMIN — HYDROXYZINE HYDROCHLORIDE 100 MG: 50 TABLET ORAL at 22:05

## 2023-07-02 RX ADMIN — Medication 2 MILLI-UNITS/MIN: at 09:46

## 2023-07-02 ASSESSMENT — ACTIVITIES OF DAILY LIVING (ADL)
ADLS_ACUITY_SCORE: 20

## 2023-07-02 NOTE — PROGRESS NOTES
Cook catheter placed by Brunilda Plaza CNM 80 ml in uterine and 80 ml in vaginal balloon. Per CHRISTIANO, ok to keep pitocin at 16mu/min and continue to go up to 24 mu/min

## 2023-07-02 NOTE — PROGRESS NOTES
Cervidil placed at 2046 following informed consent. SVE per flowsheet. Pt educated on cervidil and to remain lying in comfortable position for 2 hours prior to ambulating and voiding. Writer educated pt on IV access indications, pt agreeable to IV placement. Hot compress applied to left arm.

## 2023-07-02 NOTE — PLAN OF CARE
"BPs elevated but below treatable range; afebrile. Able to sleep overnight. Patient reports \" a couple\" painful contractions overnight.  at bedside and supportive. Cervidil to be removed this AM. Blanca batista in place for fetal monitoring; broken tracing at time d/t maternal positioning.   Problem: Labor  Goal: Hemostasis  Outcome: Progressing  Goal: Stable Fetal Wellbeing  Outcome: Progressing  Intervention: Promote and Monitor Fetal Wellbeing    Goal: Effective Progression to Delivery  Outcome: Progressing  Goal: Absence of Infection Signs and Symptoms  Outcome: Progressing  Goal: Acceptable Pain Control  Outcome: Progressing  Goal: Normal Uterine Contraction Pattern  Outcome: Progressing                           "

## 2023-07-02 NOTE — PROGRESS NOTES
Cervidil removed at 0840, SVE by RN 1/60/-1, Angel score of 8    Brunilda Plaza CNM at bedside to discuss next steps in induction with patient. Plan to start IV oxytocin infusion 1 hour after cervidil removal. Patient in agreement with plan.

## 2023-07-02 NOTE — PROGRESS NOTES
Writer had previously spoken with Mita Wong Labor Charge nurse and updated her that cook catheter was placed and okay to go up to 24mu/min.  Writer has seen lower max amounts when used for cervical ripening in other patient care situations.  Questioned to charge if this was okay per policy to go up to 24mu/min. Charge nurse was looking into it and spoke with Dr. Kelly, the in house OB. Mita updated writer that she had spoken to Vane Plaza and unit is not feeling comfortable going up to 24 mu/min.  Order given to change pitocin to 8mu/min.

## 2023-07-02 NOTE — PROGRESS NOTES
Oxytocin infusion started at 0946, triturating per protocol. CNM at bedside at 1425 to assess labor. SVE 2/70/-2. Oxytocin infusion at 16 miliunits. Plan to continue current management at this time. CNM available for AROM when patient desires

## 2023-07-02 NOTE — PROGRESS NOTES
Patient would prefer trying the cook catheter prior to AROM due to risk of infection for baby. Brunilda updated, planning to come to hospital to place cook catheter

## 2023-07-02 NOTE — PROGRESS NOTES
Per pt request, RN called Jean Claude Peña CNM requesting cervidil order for 2030. CNM to place order. Provider updated on BPs since admission, elevated but not treatable in severe range.

## 2023-07-02 NOTE — PROGRESS NOTES
"Patient Name:  Susanna Fraser  :      1990  MRN:      [id number]    Assessment:     at 39w5d  IOL labor  Category 1 FHTs  Intact  Angel 8      Plan:   -Discussed R/B/A of pitocin  -Discussed next intervention AROM if needed  -Reviewed internal monitors as needed  -Reviewed birth plan  -Routine support & management. Encourage position changes, ambulation as appropriate, rest as desired.  -Anticipate progress and NSVB.   -Reevaluate progress in 8 hours or sooner with a change in status.     Subjective:  Susanna Fraser is coping well with contractions, denies feeling anything. Good PO fluid intake.   Voiding without issue. Family supportive at bedside. Discussion of Pitocin and next steps. Not planning pain medication but aware of her options. Has contacted leeanna.       Objective:  /67   Temp 97.8  F (36.6  C) (Oral)   Resp 18   Ht 1.727 m (5' 8\")   Wt (!) 152 kg (335 lb)   LMP  (LMP Unknown)   BMI 50.94 kg/m      FHR:Baseline: 135 bpm, Variability: Moderate (6 - 25 bpm), Accelerations: present and Decelerations:occasional variable, using Blanca monitor    Uterine contractions:TocoFrequency: occasionally, palpating mild  SVE:/-2 per RN    Provider: PRISCILA Gross CNM      Date:  2023  Time:  9:36 AM    "

## 2023-07-02 NOTE — PLAN OF CARE
Cervidil in place. EFM: Cat 1 tracing +accels. Contractions are occasional, nonpalpable. Susanna is resting comfortably with peanut ball and changing her position independently. RN available for labor support. Continuing with current POC.     Problem: Labor  Goal: Hemostasis  Outcome: Progressing  Goal: Stable Fetal Wellbeing  Outcome: Progressing  Goal: Effective Progression to Delivery  Outcome: Progressing  Goal: Absence of Infection Signs and Symptoms  Outcome: Progressing  Goal: Acceptable Pain Control  Outcome: Progressing  Goal: Normal Uterine Contraction Pattern  Outcome: Progressing   Goal Outcome Evaluation:      Plan of Care Reviewed With: patient, spouse    Overall Patient Progress: improvingOverall Patient Progress: improving

## 2023-07-02 NOTE — PROGRESS NOTES
"Patient Name:  Susanna Fraser  :      1990  MRN:      [id number]    Assessment:     at 39w5d  IOL labor  Category 1 FHTs  intact      Plan:   -Discussed AROM for next intervention, she initially was agreeable in a couple hours then changed her mind and really wanted to try a cook cath, reviewed no need for continued ripening but she would really prefer the cook cath over AROM at this time  -Reviewed Cook cath with pitocin higher than 8mu policy and with Dr Solis, not contraindicated  -Will turn pitocin to 8mu as RN not comfortable increasing outside of usual plan  -Discussed with pt the indications for, risks, benefits and alternatives of cook catheter placement for cervical ripening. Informed verbal consent obtained. Pt placed in the lithotomy position. Digital exam completed, cervix was easily palpated. . Cook catheter, with stylet, inserted into the vagina and traversed through the cervix until the uterine balloon was above the level of the internal os. Stylet removed and catheter advanced until both balloons entered the cervical canal. 40 mL of saline instilled into the uterine balloon. Catheter was gently pulled back until the uterine balloon abutted the internal os. An additional 40 mL of saline instilled for a total of 80 mL in the uterine balloon only.  80mls in vaginal balloon. Pt tolerated procedure well. Continue low dose pitocin    -Routine support & management. Encourage position changes, ambulation as appropriate, rest as desired.  -Anticipate progress and NSVB.   -Reevaluate progress in 12 hours or sooner with a change in status.     Subjective:  Susanna Fraser is coping well with contractions. Using non pharmacologic  for pain relief. Good PO fluid intake.   Voiding without issue. Family supportive at bedside.       Objective:  /78   Temp 97.7  F (36.5  C) (Oral)   Resp 20   Ht 1.727 m (5' 8\")   Wt (!) 152 kg (335 lb)   LMP  (LMP Unknown)   BMI 50.94 kg/m  "     FHR:Baseline: 135 bpm, Variability: Moderate (6 - 25 bpm), Accelerations: present and Decelerations: Variable x1    Uterine contractions:TocoFrequency: Every 1-2 minutes, Duration: 60 seconds and Intensity: mild    SVE:2/70/-2, soft, midposition    Provider: PRISCILA Gross CNM      Date:  7/2/2023  Time:  4:45 PM

## 2023-07-03 VITALS
DIASTOLIC BLOOD PRESSURE: 73 MMHG | TEMPERATURE: 98.8 F | WEIGHT: 293 LBS | RESPIRATION RATE: 18 BRPM | BODY MASS INDEX: 44.41 KG/M2 | HEIGHT: 68 IN | SYSTOLIC BLOOD PRESSURE: 121 MMHG

## 2023-07-03 LAB
ALBUMIN MFR UR ELPH: 29.5 MG/DL
ALT SERPL W P-5'-P-CCNC: 14 U/L (ref 0–50)
AST SERPL W P-5'-P-CCNC: 25 U/L (ref 0–45)
BASOPHILS # BLD AUTO: 0 10E3/UL (ref 0–0.2)
BASOPHILS NFR BLD AUTO: 0 %
CREAT SERPL-MCNC: 0.63 MG/DL (ref 0.51–0.95)
CREAT UR-MCNC: 211.6 MG/DL
EOSINOPHIL # BLD AUTO: 0.1 10E3/UL (ref 0–0.7)
EOSINOPHIL NFR BLD AUTO: 1 %
ERYTHROCYTE [DISTWIDTH] IN BLOOD BY AUTOMATED COUNT: 13.9 % (ref 10–15)
GFR SERPL CREATININE-BSD FRML MDRD: >90 ML/MIN/1.73M2
HCT VFR BLD AUTO: 31.4 % (ref 35–47)
HGB BLD-MCNC: 10.5 G/DL (ref 11.7–15.7)
HOLD SPECIMEN: NORMAL
HOLD SPECIMEN: NORMAL
IMM GRANULOCYTES # BLD: 0.1 10E3/UL
IMM GRANULOCYTES NFR BLD: 1 %
LYMPHOCYTES # BLD AUTO: 2 10E3/UL (ref 0.8–5.3)
LYMPHOCYTES NFR BLD AUTO: 18 %
MCH RBC QN AUTO: 27.3 PG (ref 26.5–33)
MCHC RBC AUTO-ENTMCNC: 33.4 G/DL (ref 31.5–36.5)
MCV RBC AUTO: 82 FL (ref 78–100)
MONOCYTES # BLD AUTO: 0.7 10E3/UL (ref 0–1.3)
MONOCYTES NFR BLD AUTO: 6 %
NEUTROPHILS # BLD AUTO: 8.4 10E3/UL (ref 1.6–8.3)
NEUTROPHILS NFR BLD AUTO: 74 %
NRBC # BLD AUTO: 0 10E3/UL
NRBC BLD AUTO-RTO: 0 /100
PLATELET # BLD AUTO: 277 10E3/UL (ref 150–450)
PROT/CREAT 24H UR: 0.14 MG/MG CR (ref 0–0.2)
RBC # BLD AUTO: 3.85 10E6/UL (ref 3.8–5.2)
WBC # BLD AUTO: 11.4 10E3/UL (ref 4–11)

## 2023-07-03 PROCEDURE — 36415 COLL VENOUS BLD VENIPUNCTURE: CPT | Performed by: ADVANCED PRACTICE MIDWIFE

## 2023-07-03 PROCEDURE — 82565 ASSAY OF CREATININE: CPT | Performed by: ADVANCED PRACTICE MIDWIFE

## 2023-07-03 PROCEDURE — 258N000003 HC RX IP 258 OP 636: Performed by: ADVANCED PRACTICE MIDWIFE

## 2023-07-03 PROCEDURE — 250N000013 HC RX MED GY IP 250 OP 250 PS 637: Performed by: ADVANCED PRACTICE MIDWIFE

## 2023-07-03 PROCEDURE — 84156 ASSAY OF PROTEIN URINE: CPT | Performed by: ADVANCED PRACTICE MIDWIFE

## 2023-07-03 PROCEDURE — 85025 COMPLETE CBC W/AUTO DIFF WBC: CPT | Performed by: ADVANCED PRACTICE MIDWIFE

## 2023-07-03 PROCEDURE — 84450 TRANSFERASE (AST) (SGOT): CPT | Performed by: ADVANCED PRACTICE MIDWIFE

## 2023-07-03 PROCEDURE — 84460 ALANINE AMINO (ALT) (SGPT): CPT | Performed by: ADVANCED PRACTICE MIDWIFE

## 2023-07-03 PROCEDURE — 250N000009 HC RX 250: Performed by: ADVANCED PRACTICE MIDWIFE

## 2023-07-03 RX ORDER — OXYTOCIN/0.9 % SODIUM CHLORIDE 30/500 ML
1-24 PLASTIC BAG, INJECTION (ML) INTRAVENOUS CONTINUOUS
Status: DISCONTINUED | OUTPATIENT
Start: 2023-07-03 | End: 2023-07-03 | Stop reason: HOSPADM

## 2023-07-03 RX ORDER — SODIUM CHLORIDE, SODIUM LACTATE, POTASSIUM CHLORIDE, CALCIUM CHLORIDE 600; 310; 30; 20 MG/100ML; MG/100ML; MG/100ML; MG/100ML
INJECTION, SOLUTION INTRAVENOUS CONTINUOUS PRN
Status: DISCONTINUED | OUTPATIENT
Start: 2023-07-03 | End: 2023-07-03 | Stop reason: HOSPADM

## 2023-07-03 RX ORDER — TERBUTALINE SULFATE 1 MG/ML
0.25 INJECTION, SOLUTION SUBCUTANEOUS
Status: DISCONTINUED | OUTPATIENT
Start: 2023-07-03 | End: 2023-07-03 | Stop reason: HOSPADM

## 2023-07-03 RX ORDER — HYDROXYZINE HYDROCHLORIDE 50 MG/1
100 TABLET, FILM COATED ORAL ONCE
Status: COMPLETED | OUTPATIENT
Start: 2023-07-03 | End: 2023-07-03

## 2023-07-03 RX ORDER — LIDOCAINE 40 MG/G
CREAM TOPICAL
Status: DISCONTINUED | OUTPATIENT
Start: 2023-07-03 | End: 2023-07-03 | Stop reason: HOSPADM

## 2023-07-03 RX ADMIN — Medication 2 MILLI-UNITS/MIN: at 09:07

## 2023-07-03 RX ADMIN — HYDROXYZINE HYDROCHLORIDE 100 MG: 50 TABLET ORAL at 19:58

## 2023-07-03 RX ADMIN — SODIUM CHLORIDE, POTASSIUM CHLORIDE, SODIUM LACTATE AND CALCIUM CHLORIDE: 600; 310; 30; 20 INJECTION, SOLUTION INTRAVENOUS at 09:06

## 2023-07-03 ASSESSMENT — ACTIVITIES OF DAILY LIVING (ADL)
ADLS_ACUITY_SCORE: 20

## 2023-07-03 NOTE — PROGRESS NOTES
Vane Plaza updated there have for sure been 4 variables on the terrence monitor since 2030. Pt should not be semi-fowlers for long. She needs a tilt or to be on the side otherwise variable decelerations are occurring. Plan at this time is to keep Cook catheter in right now

## 2023-07-03 NOTE — PROGRESS NOTES
Per discussion with patient,  and  in room, patient is expressing her wishes when she goes to bed to only do cook catheter and turn off the pitocin for overnight.  She is worried about pitocin saturation and would like to sleep to get ready for tomorrow. Would like to potentially shower in the morning and start pitocin again around 8-9am .  Patient educated on risks/benefits of doing this. Patient would like to stick with plan.  Vane Plaza CNM updated and Vane will put orders in for this plan.  Patient okay to shower in the morning as long as EFM monitoring is category 1 . Updated that EFM tracing has had intermittent minimal variability, and have seen a couple of variables this shift. Moderate variability currently. Updated on contraction frequency.

## 2023-07-03 NOTE — PLAN OF CARE
Problem: Labor  Goal: Effective Progression to Delivery  Outcome: Progressing   Goal Outcome Evaluation:      Plan of Care Reviewed With: patient, spouse    Overall Patient Progress: improvingOverall Patient Progress: improving    Outcome Evaluation: IOL for gestational HTN    SVE as noted this morning, reece score of 8. Discussed plan of care with Susanna for IOL with IV Pitocin. She is agreeable and her questions were answered.     Problem: Hypertensive Disorders in Pregnancy  Goal: Maternal-Fetal Stabilization  Outcome: Progressing  Hourly vital signs while on IV Pitocin. No severe range blood pressures this morning. Susanna denies any pre-e symptoms. DTR WDL and no clonus. 3+ dependent edema below knees. Reviewed risks of DVT and discussed activity and ways to reduce risks.

## 2023-07-03 NOTE — PROGRESS NOTES
Susanna awakened this morning to discuss plan of care. Informed verbal consent prior to SVE as noted. Susanna is agreeable to induction of labor with IV pitocin. Reviewed continuous fetal monitoring for surveillance, surveillance of blood pressures and symptoms/danger signs related to gestational HTN diagnosis to report to nurse. Encouraged to be out of bed as able and as blood pressure tolerates. Reviewed positions to optimize fetal engagement, rotation and maternal comfort. Aware of intermittent uterine cramping this morning that palpates mild by this writer and Susanna is able to talk through.

## 2023-07-03 NOTE — PROGRESS NOTES
Cook catheter removed. Patient declined cervical check at this time. Patient states that she has been sleeping soundly overnight and is offering no complaints of pain. Vitals are WNL, voiding without difficulty.     Renata Barnard RN 4:45 AM 07/03/23

## 2023-07-03 NOTE — PROGRESS NOTES
Susanna remains comfortable with contractions that primarily palpate mild and remain irregular, often coupling or tripling with pitocin at 22 mu at this time. Willing to try several different positions throughout the day to encourage labor and fetal engagement. No severe blood pressures or pre-e symptoms. Reviewed EFM tracing with CNM in department. Plan made to repeat pre-e labs, then will reevaluate cervix and determine plan of care.

## 2023-07-03 NOTE — PROGRESS NOTES
Report received and care assumed. Informed that patient requested to sleep this morning and plan was for CNM to round, evaluate patient and decide plan of care. Patient and SO appear to be sleeping with AM rounds and room is quiet, dark. Continuous EFM for surveillance via [employer] system. Reviewed prenatal record and patient's birth plan.  Marjan Rowe, CNM called to establish plan of care. She is with patients at  this morning and requests for nurse to awaken patient if still sleeping in next hour and perform SVE. CNM will place orders for IV Pitocin remotely. Informed of stable vital signs overnight, cat II EFM tracing initially this morning due to single non significant variable deceleration, irregular and infrequent uterine contractions. Will see patient in person later this morning.

## 2023-07-03 NOTE — PROGRESS NOTES
Call from RN, pt requesting to turn off pitocin overnight and leave cook in place. Cook due to be removed at 4am. She is requesting to restart pitocin when she wakes and is ready around 9am. Previous discussion with patient reviewed reason for induction of labor and continuing towards labor. She is really wanting her body to do what it is meant to do and work with it. Aware of risks for preeclampsia and PPH.

## 2023-07-03 NOTE — PROGRESS NOTES
"Patient Name:  Susanna Fraser  :      1990  MRN:      [id number]    Assessment:     at 39w6d  Latent phase labor  Category 1 FHTs,    Membranes intact  Gestational HTN  BMI 47  Hx depression, unmedicated and stable   Prominent fetal renal pelvis R 7.8, L 7.3      Plan:   -Continue position changes to optimize fetal descent.   -Discussed options for the day including continuing to increase pitocin per protocol, pt agrees w/ plan  -Discussed option to discharge tonight if BP stable, HELLP labs reassuring, minimal cervical change, absence of strong, regular ctx, fetal status reassuring w/ plan to return in 1-2 days for IOL.   -Discussed amniotomy, including R/B/A, pt plans to consider if ctx become more intense, regular, concerned w/ potential for interventions  -Routine support & management. Encourage position changes, ambulation as appropriate, rest as desired   -Reviewed birth plan  -Anticipate progress and NSVB.   -Reevaluate progress in 6-8 hours or sooner with a change in status.     Subjective:  Susanna Fraser is coping well with irregular contractions, feels fatigue from hospital stay and \"ready to go home.\"  Using non pharmacologic methods for pain relief, currently doing frequent position change to optimize positioning and labor progress. Good PO fluid intake. Pt has questions about plan for day, strong desire to prevent non-emergent CS.   Voiding without issue. , Fei, and family supportive at bedside.       Objective:  /68 (BP Location: Right arm, Patient Position: Sitting, Cuff Size: Adult Large)   Temp 98  F (36.7  C) (Oral)   Resp 18   Ht 1.727 m (5' 8\")   Wt (!) 151.2 kg (333 lb 6.4 oz)  BMI 50.69 kg/m      FHR:Baseline: 135 bpm, Variability: moderate, Accelerations: present and Decelerations: Variable    Uterine contractions:Ranson Frequency: irregular, Duration: 30-60 seconds and Intensity: mild    SVE:4/50-70%/-2 per RN exam at 0900    Provider: Tova" [facility]      Date:  7/3/2023  Time:  11:52 AM    I was present for SNM assessment and kelly plan in collaboration with patient and student.  PRISCILA Rodriguez CNM on 7/3/2023 at 12:19 PM

## 2023-07-04 ENCOUNTER — TRANSFERRED RECORDS (OUTPATIENT)
Dept: HEALTH INFORMATION MANAGEMENT | Facility: CLINIC | Age: 33
End: 2023-07-04

## 2023-07-04 ENCOUNTER — HOSPITAL ENCOUNTER (INPATIENT)
Facility: HOSPITAL | Age: 33
LOS: 2 days | Discharge: HOME | End: 2023-07-06
Attending: STUDENT IN AN ORGANIZED HEALTH CARE EDUCATION/TRAINING PROGRAM | Admitting: ADVANCED PRACTICE MIDWIFE
Payer: COMMERCIAL

## 2023-07-04 DIAGNOSIS — D62 ANEMIA DUE TO BLOOD LOSS, ACUTE: ICD-10-CM

## 2023-07-04 PROBLEM — O42.90 PROM (PREMATURE RUPTURE OF MEMBRANES): Status: ACTIVE | Noted: 2023-07-04

## 2023-07-04 LAB
ABO/RH(D): NORMAL
ALT SERPL W P-5'-P-CCNC: 13 U/L (ref 0–50)
ANTIBODY SCREEN: NEGATIVE
AST SERPL W P-5'-P-CCNC: 20 U/L (ref 0–45)
CREAT SERPL-MCNC: 0.63 MG/DL (ref 0.51–0.95)
ERYTHROCYTE [DISTWIDTH] IN BLOOD BY AUTOMATED COUNT: 14.1 % (ref 10–15)
GFR SERPL CREATININE-BSD FRML MDRD: >90 ML/MIN/1.73M2
HCT VFR BLD AUTO: 31.4 % (ref 35–47)
HGB BLD-MCNC: 10.2 G/DL (ref 11.7–15.7)
HOLD SPECIMEN: NORMAL
MCH RBC QN AUTO: 26.6 PG (ref 26.5–33)
MCHC RBC AUTO-ENTMCNC: 32.5 G/DL (ref 31.5–36.5)
MCV RBC AUTO: 82 FL (ref 78–100)
PLATELET # BLD AUTO: 288 10E3/UL (ref 150–450)
RBC # BLD AUTO: 3.84 10E6/UL (ref 3.8–5.2)
RUPTURE OF FETAL MEMBRANES BY ROM PLUS: POSITIVE
SPECIMEN EXPIRATION DATE: NORMAL
WBC # BLD AUTO: 9.8 10E3/UL (ref 4–11)

## 2023-07-04 PROCEDURE — 84112 EVAL AMNIOTIC FLUID PROTEIN: CPT | Performed by: ADVANCED PRACTICE MIDWIFE

## 2023-07-04 PROCEDURE — 84450 TRANSFERASE (AST) (SGOT): CPT | Performed by: ADVANCED PRACTICE MIDWIFE

## 2023-07-04 PROCEDURE — 84460 ALANINE AMINO (ALT) (SGPT): CPT | Performed by: ADVANCED PRACTICE MIDWIFE

## 2023-07-04 PROCEDURE — 120N000001 HC R&B MED SURG/OB

## 2023-07-04 PROCEDURE — 250N000009 HC RX 250: Performed by: ADVANCED PRACTICE MIDWIFE

## 2023-07-04 PROCEDURE — 86850 RBC ANTIBODY SCREEN: CPT | Performed by: ADVANCED PRACTICE MIDWIFE

## 2023-07-04 PROCEDURE — 86901 BLOOD TYPING SEROLOGIC RH(D): CPT | Performed by: ADVANCED PRACTICE MIDWIFE

## 2023-07-04 PROCEDURE — 82565 ASSAY OF CREATININE: CPT | Performed by: ADVANCED PRACTICE MIDWIFE

## 2023-07-04 PROCEDURE — 36415 COLL VENOUS BLD VENIPUNCTURE: CPT | Performed by: ADVANCED PRACTICE MIDWIFE

## 2023-07-04 PROCEDURE — 85027 COMPLETE CBC AUTOMATED: CPT | Performed by: ADVANCED PRACTICE MIDWIFE

## 2023-07-04 PROCEDURE — 86780 TREPONEMA PALLIDUM: CPT | Performed by: ADVANCED PRACTICE MIDWIFE

## 2023-07-04 PROCEDURE — 258N000003 HC RX IP 258 OP 636: Performed by: ADVANCED PRACTICE MIDWIFE

## 2023-07-04 RX ORDER — KETOROLAC TROMETHAMINE 30 MG/ML
30 INJECTION, SOLUTION INTRAMUSCULAR; INTRAVENOUS
Status: DISCONTINUED | OUTPATIENT
Start: 2023-07-04 | End: 2023-07-06 | Stop reason: HOSPADM

## 2023-07-04 RX ORDER — OXYTOCIN/0.9 % SODIUM CHLORIDE 30/500 ML
100-340 PLASTIC BAG, INJECTION (ML) INTRAVENOUS CONTINUOUS PRN
Status: DISCONTINUED | OUTPATIENT
Start: 2023-07-04 | End: 2023-07-06 | Stop reason: HOSPADM

## 2023-07-04 RX ORDER — CITRIC ACID/SODIUM CITRATE 334-500MG
30 SOLUTION, ORAL ORAL
Status: DISCONTINUED | OUTPATIENT
Start: 2023-07-04 | End: 2023-07-05 | Stop reason: HOSPADM

## 2023-07-04 RX ORDER — NALOXONE HYDROCHLORIDE 0.4 MG/ML
0.4 INJECTION, SOLUTION INTRAMUSCULAR; INTRAVENOUS; SUBCUTANEOUS
Status: DISCONTINUED | OUTPATIENT
Start: 2023-07-04 | End: 2023-07-05 | Stop reason: HOSPADM

## 2023-07-04 RX ORDER — IBUPROFEN 800 MG/1
800 TABLET, FILM COATED ORAL
Status: DISCONTINUED | OUTPATIENT
Start: 2023-07-04 | End: 2023-07-06 | Stop reason: HOSPADM

## 2023-07-04 RX ORDER — NALOXONE HYDROCHLORIDE 0.4 MG/ML
0.2 INJECTION, SOLUTION INTRAMUSCULAR; INTRAVENOUS; SUBCUTANEOUS
Status: DISCONTINUED | OUTPATIENT
Start: 2023-07-04 | End: 2023-07-05 | Stop reason: HOSPADM

## 2023-07-04 RX ORDER — LIDOCAINE 40 MG/G
CREAM TOPICAL
Status: DISCONTINUED | OUTPATIENT
Start: 2023-07-04 | End: 2023-07-05 | Stop reason: HOSPADM

## 2023-07-04 RX ORDER — METHYLERGONOVINE MALEATE 0.2 MG/ML
200 INJECTION INTRAVENOUS
Status: DISCONTINUED | OUTPATIENT
Start: 2023-07-04 | End: 2023-07-05 | Stop reason: HOSPADM

## 2023-07-04 RX ORDER — METOCLOPRAMIDE 10 MG/1
10 TABLET ORAL EVERY 6 HOURS PRN
Status: DISCONTINUED | OUTPATIENT
Start: 2023-07-04 | End: 2023-07-05 | Stop reason: HOSPADM

## 2023-07-04 RX ORDER — OXYTOCIN 10 [USP'U]/ML
10 INJECTION, SOLUTION INTRAMUSCULAR; INTRAVENOUS
Status: DISCONTINUED | OUTPATIENT
Start: 2023-07-04 | End: 2023-07-05 | Stop reason: HOSPADM

## 2023-07-04 RX ORDER — METOCLOPRAMIDE HYDROCHLORIDE 5 MG/ML
10 INJECTION INTRAMUSCULAR; INTRAVENOUS EVERY 6 HOURS PRN
Status: DISCONTINUED | OUTPATIENT
Start: 2023-07-04 | End: 2023-07-05 | Stop reason: HOSPADM

## 2023-07-04 RX ORDER — ONDANSETRON 2 MG/ML
4 INJECTION INTRAMUSCULAR; INTRAVENOUS EVERY 6 HOURS PRN
Status: DISCONTINUED | OUTPATIENT
Start: 2023-07-04 | End: 2023-07-05 | Stop reason: HOSPADM

## 2023-07-04 RX ORDER — CARBOPROST TROMETHAMINE 250 UG/ML
250 INJECTION, SOLUTION INTRAMUSCULAR
Status: DISCONTINUED | OUTPATIENT
Start: 2023-07-04 | End: 2023-07-05 | Stop reason: HOSPADM

## 2023-07-04 RX ORDER — PROCHLORPERAZINE 25 MG
25 SUPPOSITORY, RECTAL RECTAL EVERY 12 HOURS PRN
Status: DISCONTINUED | OUTPATIENT
Start: 2023-07-04 | End: 2023-07-05 | Stop reason: HOSPADM

## 2023-07-04 RX ORDER — SODIUM CHLORIDE, SODIUM LACTATE, POTASSIUM CHLORIDE, CALCIUM CHLORIDE 600; 310; 30; 20 MG/100ML; MG/100ML; MG/100ML; MG/100ML
INJECTION, SOLUTION INTRAVENOUS CONTINUOUS PRN
Status: DISCONTINUED | OUTPATIENT
Start: 2023-07-04 | End: 2023-07-05 | Stop reason: HOSPADM

## 2023-07-04 RX ORDER — MISOPROSTOL 200 UG/1
400 TABLET ORAL
Status: DISCONTINUED | OUTPATIENT
Start: 2023-07-04 | End: 2023-07-05 | Stop reason: HOSPADM

## 2023-07-04 RX ORDER — OXYTOCIN/0.9 % SODIUM CHLORIDE 30/500 ML
1-24 PLASTIC BAG, INJECTION (ML) INTRAVENOUS CONTINUOUS
Status: DISCONTINUED | OUTPATIENT
Start: 2023-07-04 | End: 2023-07-05 | Stop reason: HOSPADM

## 2023-07-04 RX ORDER — OXYTOCIN 10 [USP'U]/ML
10 INJECTION, SOLUTION INTRAMUSCULAR; INTRAVENOUS
Status: DISCONTINUED | OUTPATIENT
Start: 2023-07-04 | End: 2023-07-06 | Stop reason: HOSPADM

## 2023-07-04 RX ORDER — MISOPROSTOL 200 UG/1
800 TABLET ORAL
Status: DISCONTINUED | OUTPATIENT
Start: 2023-07-04 | End: 2023-07-05 | Stop reason: HOSPADM

## 2023-07-04 RX ORDER — FENTANYL CITRATE 50 UG/ML
100 INJECTION, SOLUTION INTRAMUSCULAR; INTRAVENOUS
Status: DISCONTINUED | OUTPATIENT
Start: 2023-07-04 | End: 2023-07-05 | Stop reason: HOSPADM

## 2023-07-04 RX ORDER — OXYTOCIN/0.9 % SODIUM CHLORIDE 30/500 ML
340 PLASTIC BAG, INJECTION (ML) INTRAVENOUS CONTINUOUS PRN
Status: DISCONTINUED | OUTPATIENT
Start: 2023-07-04 | End: 2023-07-05 | Stop reason: HOSPADM

## 2023-07-04 RX ORDER — ONDANSETRON 4 MG/1
4 TABLET, ORALLY DISINTEGRATING ORAL EVERY 6 HOURS PRN
Status: DISCONTINUED | OUTPATIENT
Start: 2023-07-04 | End: 2023-07-05 | Stop reason: HOSPADM

## 2023-07-04 RX ORDER — PROCHLORPERAZINE MALEATE 10 MG
10 TABLET ORAL EVERY 6 HOURS PRN
Status: DISCONTINUED | OUTPATIENT
Start: 2023-07-04 | End: 2023-07-05 | Stop reason: HOSPADM

## 2023-07-04 RX ADMIN — Medication 2 MILLI-UNITS/MIN: at 23:12

## 2023-07-04 RX ADMIN — SODIUM CHLORIDE, POTASSIUM CHLORIDE, SODIUM LACTATE AND CALCIUM CHLORIDE: 600; 310; 30; 20 INJECTION, SOLUTION INTRAVENOUS at 23:10

## 2023-07-04 ASSESSMENT — ACTIVITIES OF DAILY LIVING (ADL)
FALL_HISTORY_WITHIN_LAST_SIX_MONTHS: NO
DIFFICULTY_EATING/SWALLOWING: NO
ADLS_ACUITY_SCORE: 20
VISION_MANAGEMENT: CONTACTS
DOING_ERRANDS_INDEPENDENTLY_DIFFICULTY: NO
ADLS_ACUITY_SCORE: 20
CHANGE_IN_FUNCTIONAL_STATUS_SINCE_ONSET_OF_CURRENT_ILLNESS/INJURY: NO
DRESSING/BATHING_DIFFICULTY: NO
ADLS_ACUITY_SCORE: 20
WALKING_OR_CLIMBING_STAIRS_DIFFICULTY: NO
CONCENTRATING,_REMEMBERING_OR_MAKING_DECISIONS_DIFFICULTY: NO
TOILETING_ISSUES: NO
WEAR_GLASSES_OR_BLIND: YES

## 2023-07-04 NOTE — PROGRESS NOTES
"Plan:   -Discussed option for amniotomy as next step for augmentation, not currently recommending d/t lack of regular ctx, pt opts to defer at this time  -Discussed R/B/A of continued pitocin titration vs expectant management at home. At this time she would like to proceed with discharge to home with plan to attempt induction on 7/5  -Reviewed status of gHTN and reassuring fetal status including HELLP labs - WNL, stable BP throughout day, category 1 FHT  -Reviewed risk of gHTN progressing to preeclampsia which , including s/sx of preeclampsia, how to reach provider, when to come in. Pt voices understanding  -Continue BID BP checks at home, pt aware of parameters for severe range pressures  -Recommended vistaril for therapeutic rest at home, encouraged self care at home.    Subjective:  Susanna Fraser is coping well with contractions. Using non pharmacologic methods for pain relief. Good PO fluid intake. Today she has made significant effort to optimize descent and positioning, including multiple positions and movements, has alternated rest w/ activity. Denies discomfort w/ ctx. Denies LOF, VB, endorses fetal movement. Highest dose of pitocin today was 22.Voiding without issue. States she is feeling discouraged by lack of progress. , Fei, supportive at bedside.       Objective:  /72 (BP Location: Left arm, Patient Position: Semi-Simmons's, Cuff Size: Adult Regular)   Pulse 73   Temp 97.9  F (36.6  C) (Oral)   Resp 18   Ht 1.6 m (5' 3\")   Wt 94.3 kg (208 lb)   BMI 36.85 kg/m      FHR:Baseline: 135 bpm, Variability: moderate, Accelerations: present and Decelerations: Absent    Uterine contractions:Bulverde, Infrequent, irregular, mild to palpation     SVE:4-5/80/-2, soft, posterior position    Provider: [facility]      Date:  7/3/2023  Time:  7:22 PM  Plan developed by this writer with patient. Agree with [facility] assessment and documentation. I was present for all assessment and student " interactions.  Dorene Rowe, PRISCILA CNM on 7/3/2023 at 7:59 PM

## 2023-07-04 NOTE — PROGRESS NOTES
Data: [facility] presented [facility]: 2023  5:12 PM.  Reason for maternal/fetal assessment is leaking vaginal fluid. Patient reports a small gush of clear fluid after emptying her bladder and again after walking out of the bathroom, followed several small gushes of fluid as she prepared to come to the hospital. No apparent LOF upon arrival or on karlee pad. Patient denies fever, chills, uterine tenderness, vaginal bleeding, headache, visual changes, epigastric pain. Patient reports fetal movement is normal. Patient is a 40w0d . Prenatal record reviewed. Pregnancy has been complicated by hypertension. Support person, Fei, is present and supportive.    Fetal HR baseline was 130, variability is minimal (detectable, amplitude less than or equal to 5 bpm). Accelerations: increase 15 bpm above baseline lasting 15 seconds. Decelerations: absent. Uterine assessment is mild by palpation during single contraction and soft by palpation at rest. Cervical exam deferred. Fetal presentation   per [place]. Membranes: ROM Plus test pending.    Vital signs wnl. Patient reports no pain and is coping.     Action: Verbal consent for EFM. Triage assessment completed. Patient agreeable to plan of care for assessment, application of fetal monitors and ROM plus test. Will update provider and await further orders once ROM Plus test resulted.

## 2023-07-04 NOTE — PROGRESS NOTES
Leaking small amounts of clear fluid now. ROM Plus resulted positive. No regular uterine activity. Soft and non tender uterine resting tone. CHRISTIANO Hsu informed of patient arrival, ROM Plus result, blood pressures since arrival. Will evaluate in person shortly and place orders.

## 2023-07-04 NOTE — PROGRESS NOTES
Discharge orders received from Dr. Rowe. AVS discussed with patient. Induction scheduled for 7/6/23 at 7:30AM. Per MD continue to obtain BID BP checks at home, pt aware of parameters for severe ranges pressures. PO 100mg Visteril given to patient for therapeutic rest at home, order placed by MD. Discussed labor signs and symptoms and when to come into the hospital if needed before induction date, pt verbalized understanding.

## 2023-07-04 NOTE — H&P
"HISTORY AND PHYSICAL UPDATE ADMISSION EXAM    Name: Susanna Fraser  YOB: 1990  Medical Record Number: [id number]    History of Present Illness: Susanna Fraser is a 32 year old female who is 40w0d pregnant and being admitted for PROM. Underwent IOL from -7/3 for GHTN and BMI. Received 2 doses of cytotec, 12 hours of cervidil, pitocin to max of 16mu, cooks catheter x 12 hours with pitocin at 8mu, then pitocin to 22mu. She didn't make change past 4-5cm /80/-2 and was discharged home with failed induction of labor. Plan was to return  for 2nd IOL attempt. Today at 1615 she had small trickle of fluid. A few more trickles when getting ready to come to hospital and more leaking in triage. ROM plus was positive and she was admitted. Normotensive upon presentation today.     She has had early and consistent prenatal care. Reassuring weekly  testing since 34 weeks. EFW at 36 weeks was in the 84% at 7-0 lbs.       Estimated Date of Delivery: 2023    EGA 40w0d    OB History    Para Term  AB Living   1 0 0 0 0 0   SAB IAB Ectopic Multiple Live Births   0 0 0 0 0      # Outcome Date GA Lbr Deonte/2nd Weight Sex Delivery Anes PTL Lv   1 Current                 Lab Results   Component Value Date    AS Negative 2023    HGB 10.5 (L) 2023       Prenatal Complications:  1. BMI 47  2. GHTN  3. Hx of depression; un-medicated and stable  4. Prominent fetal renal pelvis R 7.8, L 7.3  5. Failed IOL -7/3  6. Mild anemia    Exam:      /68 (BP Location: Right arm, Patient Position: Left side, Cuff Size: Adult Large)   Temp 98.8  F (37.1  C) (Oral)   Resp 20   Ht 1.727 m (5' 8\")   Wt (!) 153.3 kg (338 lb)   LMP  (LMP Unknown)   Breastfeeding No   BMI 51.39 kg/m      Working on getting DocuTAP monitor in place.   No contractions    HEENT grossly normal  Neck: no lymphadenopathy or thryoidomegaly  Lungs unlabored breathing  ABD gravid, non-tender  EXT:  +3 " edema, moves freely  Vaginal exam: pt decliend  Membranes: PROM, clear fluid  Vertex verified by bedside ultrasound     Assessment: induction of labor, indication premature rupture of membranes    Plan:   Admit - see IP orders  After discussion, Susanna has strong desire for expectant management x 6 hours but is amendable to starting pitocin at that time if no labor. Orders placed.  Will review birth plan  Admit labs ordered  Anticipate     Prenatal record reviewed.    Dr. Garcia aware of patient status and remains available for consultation and collaboration as needed.      Aracelis Hernandes, PRISCILA ALVARADO      2023   6:51 PM

## 2023-07-05 LAB — T PALLIDUM AB SER QL: NONREACTIVE

## 2023-07-05 PROCEDURE — 722N000001 HC LABOR CARE VAGINAL DELIVERY SINGLE

## 2023-07-05 PROCEDURE — 3E033VJ INTRODUCTION OF OTHER HORMONE INTO PERIPHERAL VEIN, PERCUTANEOUS APPROACH: ICD-10-PCS | Performed by: ADVANCED PRACTICE MIDWIFE

## 2023-07-05 PROCEDURE — 250N000009 HC RX 250

## 2023-07-05 PROCEDURE — 250N000011 HC RX IP 250 OP 636: Performed by: ADVANCED PRACTICE MIDWIFE

## 2023-07-05 PROCEDURE — 0KQM0ZZ REPAIR PERINEUM MUSCLE, OPEN APPROACH: ICD-10-PCS | Performed by: ADVANCED PRACTICE MIDWIFE

## 2023-07-05 PROCEDURE — 250N000013 HC RX MED GY IP 250 OP 250 PS 637: Performed by: ADVANCED PRACTICE MIDWIFE

## 2023-07-05 PROCEDURE — 0UQGXZZ REPAIR VAGINA, EXTERNAL APPROACH: ICD-10-PCS | Performed by: ADVANCED PRACTICE MIDWIFE

## 2023-07-05 PROCEDURE — 120N000001 HC R&B MED SURG/OB

## 2023-07-05 RX ORDER — NALOXONE HYDROCHLORIDE 0.4 MG/ML
0.2 INJECTION, SOLUTION INTRAMUSCULAR; INTRAVENOUS; SUBCUTANEOUS
Status: DISCONTINUED | OUTPATIENT
Start: 2023-07-05 | End: 2023-07-06 | Stop reason: HOSPADM

## 2023-07-05 RX ORDER — ENOXAPARIN SODIUM 100 MG/ML
40 INJECTION SUBCUTANEOUS EVERY 12 HOURS
Status: DISCONTINUED | OUTPATIENT
Start: 2023-07-06 | End: 2023-07-06 | Stop reason: HOSPADM

## 2023-07-05 RX ORDER — ACETAMINOPHEN 325 MG/1
650 TABLET ORAL EVERY 4 HOURS PRN
Status: DISCONTINUED | OUTPATIENT
Start: 2023-07-05 | End: 2023-07-06 | Stop reason: HOSPADM

## 2023-07-05 RX ORDER — NALOXONE HYDROCHLORIDE 0.4 MG/ML
0.4 INJECTION, SOLUTION INTRAMUSCULAR; INTRAVENOUS; SUBCUTANEOUS
Status: DISCONTINUED | OUTPATIENT
Start: 2023-07-05 | End: 2023-07-06 | Stop reason: HOSPADM

## 2023-07-05 RX ORDER — MISOPROSTOL 200 UG/1
800 TABLET ORAL
Status: DISCONTINUED | OUTPATIENT
Start: 2023-07-05 | End: 2023-07-06 | Stop reason: HOSPADM

## 2023-07-05 RX ORDER — METHYLERGONOVINE MALEATE 0.2 MG/ML
200 INJECTION INTRAVENOUS
Status: DISCONTINUED | OUTPATIENT
Start: 2023-07-05 | End: 2023-07-06 | Stop reason: HOSPADM

## 2023-07-05 RX ORDER — OXYTOCIN/0.9 % SODIUM CHLORIDE 30/500 ML
340 PLASTIC BAG, INJECTION (ML) INTRAVENOUS CONTINUOUS PRN
Status: DISCONTINUED | OUTPATIENT
Start: 2023-07-05 | End: 2023-07-06 | Stop reason: HOSPADM

## 2023-07-05 RX ORDER — CARBOPROST TROMETHAMINE 250 UG/ML
250 INJECTION, SOLUTION INTRAMUSCULAR
Status: DISCONTINUED | OUTPATIENT
Start: 2023-07-05 | End: 2023-07-06 | Stop reason: HOSPADM

## 2023-07-05 RX ORDER — OXYTOCIN 10 [USP'U]/ML
10 INJECTION, SOLUTION INTRAMUSCULAR; INTRAVENOUS
Status: DISCONTINUED | OUTPATIENT
Start: 2023-07-05 | End: 2023-07-06 | Stop reason: HOSPADM

## 2023-07-05 RX ORDER — BISACODYL 10 MG
10 SUPPOSITORY, RECTAL RECTAL DAILY PRN
Status: DISCONTINUED | OUTPATIENT
Start: 2023-07-05 | End: 2023-07-06 | Stop reason: HOSPADM

## 2023-07-05 RX ORDER — HYDROCORTISONE 25 MG/G
CREAM TOPICAL 3 TIMES DAILY PRN
Status: DISCONTINUED | OUTPATIENT
Start: 2023-07-05 | End: 2023-07-06 | Stop reason: HOSPADM

## 2023-07-05 RX ORDER — DOCUSATE SODIUM 100 MG/1
100 CAPSULE, LIQUID FILLED ORAL DAILY
Status: DISCONTINUED | OUTPATIENT
Start: 2023-07-05 | End: 2023-07-06 | Stop reason: HOSPADM

## 2023-07-05 RX ORDER — OXYCODONE HYDROCHLORIDE 5 MG/1
5 TABLET ORAL EVERY 4 HOURS PRN
Status: DISCONTINUED | OUTPATIENT
Start: 2023-07-05 | End: 2023-07-06 | Stop reason: HOSPADM

## 2023-07-05 RX ORDER — LIDOCAINE HYDROCHLORIDE 10 MG/ML
INJECTION, SOLUTION EPIDURAL; INFILTRATION; INTRACAUDAL; PERINEURAL
Status: COMPLETED
Start: 2023-07-05 | End: 2023-07-05

## 2023-07-05 RX ORDER — IBUPROFEN 800 MG/1
800 TABLET, FILM COATED ORAL EVERY 6 HOURS PRN
Status: DISCONTINUED | OUTPATIENT
Start: 2023-07-05 | End: 2023-07-06 | Stop reason: HOSPADM

## 2023-07-05 RX ORDER — MODIFIED LANOLIN
OINTMENT (GRAM) TOPICAL
Status: DISCONTINUED | OUTPATIENT
Start: 2023-07-05 | End: 2023-07-06 | Stop reason: HOSPADM

## 2023-07-05 RX ORDER — MISOPROSTOL 200 UG/1
400 TABLET ORAL
Status: DISCONTINUED | OUTPATIENT
Start: 2023-07-05 | End: 2023-07-06 | Stop reason: HOSPADM

## 2023-07-05 RX ADMIN — DOCUSATE SODIUM 100 MG: 100 CAPSULE, LIQUID FILLED ORAL at 14:51

## 2023-07-05 RX ADMIN — ACETAMINOPHEN 650 MG: 325 TABLET ORAL at 19:49

## 2023-07-05 RX ADMIN — FENTANYL CITRATE 100 MCG: 50 INJECTION, SOLUTION INTRAMUSCULAR; INTRAVENOUS at 10:59

## 2023-07-05 RX ADMIN — LIDOCAINE HYDROCHLORIDE 20 ML: 10 INJECTION, SOLUTION EPIDURAL; INFILTRATION; INTRACAUDAL; PERINEURAL at 13:41

## 2023-07-05 RX ADMIN — IBUPROFEN 800 MG: 800 TABLET ORAL at 22:18

## 2023-07-05 RX ADMIN — KETOROLAC TROMETHAMINE 30 MG: 30 INJECTION, SOLUTION INTRAMUSCULAR; INTRAVENOUS at 14:36

## 2023-07-05 RX ADMIN — FENTANYL CITRATE 100 MCG: 50 INJECTION, SOLUTION INTRAMUSCULAR; INTRAVENOUS at 12:07

## 2023-07-05 ASSESSMENT — ACTIVITIES OF DAILY LIVING (ADL)
ADLS_ACUITY_SCORE: 20

## 2023-07-05 NOTE — PROGRESS NOTES
6 hours since PROM. No regular contraction pattern. Reports contractions do feel like strong menstrual cramps and stronger than any contractions she had during her IOL stay. Discussed r/b of pitocin at this time. Initially, she wanted to wait until morning to start pitocin so she could rest overnight but after discussing she was open to beginning pitocin now. Right after our conversation, did have isolated deceleration while bouncing on ball. Will obtain reactive NST and then begin pitocin titration.     PRISCILA Leo CNM on 7/4/2023 at 10:26 PM

## 2023-07-05 NOTE — L&D DELIVERY NOTE
Vaginal Delivery Note    Name: Susanna Fraser  : 1990  MRN: [id number]    PRE DELIVERY DIAGNOSIS  1) 32 year old  1 Para 0 at 40w1d        History of Present Illness: Susanna Fraser is a 32 year old female who is 40w0d pregnant and being admitted for PROM. Underwent IOL from -7/3 for GHTN and BMI. Received 2 doses of cytotec, 12 hours of cervidil, pitocin to max of 16mu, cooks catheter x 12 hours with pitocin at 8mu, then pitocin to 22mu. She didn't make change past 4-5cm /80/-2 and was discharged home with failed induction of labor. Plan was to return  for 2nd IOL attempt. Today at 1615 she had small trickle of fluid. A few more trickles when getting ready to come to hospital and more leaking in triage. ROM plus was positive and she was admitted. Normotensive upon presentation today.      She has had early and consistent prenatal care. Reassuring weekly  testing since 34 weeks. EFW at 36 weeks was in the 84% at 7-0 lbs.         Estimated Date of Delivery: 2023      Prenatal Complications:  1. BMI 47  2. GHTN  3. Hx of depression; un-medicated and stable  4. Prominent fetal renal pelvis R 7.8, L 7.3  5. Failed IOL -7/3  6. Mild anemia  POST DELIVERY DIAGNOSIS  1) 32 year old  @ 40w1d  2) Delivery of a viable infant weighing   via     YOB: 2023     Birth Time: 1:31 PM       Augmentation Yes              Indication: GHTN  Induction Yes                      Indication: GHTN    Monitors: External     GBS: Negative    ROM: SROM  Fluid Type: Clear    Labor Analgesia/Anesthesia:Non-pharmacologic    Cord pH obtained: No  Placenta Date/Time: 2023  1:50 PM   Placenta submitted to Pathology: No    Description of procedure:   32 year old  with PNC w/ MWC and pregnancy complicated by Gestational HTN presented to L&D with plan for induction.  Her hospital course was uncomplicated.  Patient progressed to complete with spontaneous rupture of membranes  and pitocin with a max miliunits of 16.   Shoulder Dystocia No  Operative Vaginal Delivery No  Infant spontaneously delivered over an 2 degree laceration.   It was repaired in the normal fashion using local anesthesia using 3-0 vicryl. There were bilateral vaginal wall lacerations that were repaired with 3-0 vicryl in an interrupted fasthion.  Infant delivered in ISHMAEL position with a right compound hand. There was a cord around the neck and body that was unwrapped after delivery.       Placenta spontaneously delivered: 7/5/2023  1:50 PM  grossly intact with 3 vessel cord.  Infant:  Live, Initially stunned infant  was handed to mom.  Skin stimulation produced improved tone and then a lusty cry. Delayed cord clamping and FOB then cut the cord.   Delivery was complicated by nothing Interventions included fundal massage and pitocin.    Delivery QBL (mL):300    Mother and Infant stable.    Jean Claude Peña CNM    7/5/2023 2:29 PM

## 2023-07-05 NOTE — PROGRESS NOTES
"LABOR PROGRESS NOTE  IUP at 40w1d     SUBJECTIVE:  States she had a large gush of clear fluid at 0500 and contractions since then have been painful. Has used hydrotherapy, position changes and the support of her  for pain mgmt.     OBJECTIVE:  BP (!) 144/68 (BP Location: Right arm)   Pulse 74   Temp 98.4  F (36.9  C) (Oral)   Resp 18   Ht 1.727 m (5' 8\")   Wt (!) 153.3 kg (338 lb)   LMP  (LMP Unknown)   SpO2 98%   Breastfeeding No   BMI 51.39 kg/m    FHT:  130 bpm, Variability: Moderate (6 - 25 bpm), Accelerations: present and Decelerations: Absent  CONTRACTIONS: TocoFrequency: Every 1-4 minutes, Duration: 40-90 seconds and Intensity: strong  CERVIX: 6/70/-1 per RN this am with caput noted  FLUID:  clear    ASSESSMENT:  @40.1 weeks  IOL for GHTN failed and pt sent home on   Returned  with SROM  Active labor  Mildly elevated BP  Pitocin at 14mu    PLAN:    - Continue with position changes to optimize fetal position  - Discussed pain mgmt options and nitrous is available if she requests it    -Anticipate   Dr. Hendricks aware of patient status and remains available for consultation and collaboration as needed.      Jean Claude Peña CNM  "

## 2023-07-05 NOTE — PLAN OF CARE
Problem: Labor  Goal: Acceptable Pain Control  Outcome: Progressing   Goal Outcome Evaluation:       * Pt Re-admitted with SROM 7/4/23 @ 1615 clear fluid, remains clear and afebrile  gHTN, Bps remained stable throughout the night  Pitocin started throughout the night  Pt started getting more uncomfortable, breathing through contractions around 05:30-06:00AM.  Tub started for pt, pt states she's coping through contractions at this time.  Pt remains on the terrence for monitoring

## 2023-07-06 VITALS
BODY MASS INDEX: 44.41 KG/M2 | SYSTOLIC BLOOD PRESSURE: 127 MMHG | HEART RATE: 91 BPM | RESPIRATION RATE: 18 BRPM | WEIGHT: 293 LBS | OXYGEN SATURATION: 98 % | DIASTOLIC BLOOD PRESSURE: 59 MMHG | HEIGHT: 68 IN | TEMPERATURE: 98.4 F

## 2023-07-06 PROBLEM — O13.9 GESTATIONAL HYPERTENSION: Status: ACTIVE | Noted: 2023-07-06

## 2023-07-06 LAB — HGB BLD-MCNC: 8.2 G/DL (ref 11.7–15.7)

## 2023-07-06 PROCEDURE — 250N000013 HC RX MED GY IP 250 OP 250 PS 637: Performed by: ADVANCED PRACTICE MIDWIFE

## 2023-07-06 PROCEDURE — 36415 COLL VENOUS BLD VENIPUNCTURE: CPT | Performed by: ADVANCED PRACTICE MIDWIFE

## 2023-07-06 PROCEDURE — 250N000011 HC RX IP 250 OP 636: Mod: JZ | Performed by: ADVANCED PRACTICE MIDWIFE

## 2023-07-06 PROCEDURE — 85018 HEMOGLOBIN: CPT | Performed by: ADVANCED PRACTICE MIDWIFE

## 2023-07-06 PROCEDURE — 258N000003 HC RX IP 258 OP 636: Performed by: ADVANCED PRACTICE MIDWIFE

## 2023-07-06 RX ORDER — DIPHENHYDRAMINE HYDROCHLORIDE 50 MG/ML
50 INJECTION INTRAMUSCULAR; INTRAVENOUS
Status: DISCONTINUED | OUTPATIENT
Start: 2023-07-06 | End: 2023-07-06 | Stop reason: HOSPADM

## 2023-07-06 RX ORDER — DOCUSATE SODIUM 100 MG/1
100-200 CAPSULE, LIQUID FILLED ORAL 2 TIMES DAILY PRN
Qty: 30 CAPSULE | Refills: 0 | Status: SHIPPED | OUTPATIENT
Start: 2023-07-06

## 2023-07-06 RX ORDER — IBUPROFEN 800 MG/1
800 TABLET, FILM COATED ORAL EVERY 8 HOURS PRN
Qty: 30 TABLET | Refills: 0 | Status: SHIPPED | OUTPATIENT
Start: 2023-07-06

## 2023-07-06 RX ORDER — METHYLPREDNISOLONE SODIUM SUCCINATE 125 MG/2ML
125 INJECTION, POWDER, LYOPHILIZED, FOR SOLUTION INTRAMUSCULAR; INTRAVENOUS
Status: DISCONTINUED | OUTPATIENT
Start: 2023-07-06 | End: 2023-07-06 | Stop reason: HOSPADM

## 2023-07-06 RX ORDER — FERROUS SULFATE 325(65) MG
325 TABLET ORAL
Qty: 30 TABLET | Refills: 1 | Status: SHIPPED | OUTPATIENT
Start: 2023-07-06

## 2023-07-06 RX ADMIN — DOCUSATE SODIUM 100 MG: 100 CAPSULE, LIQUID FILLED ORAL at 09:08

## 2023-07-06 RX ADMIN — IRON SUCROSE 300 MG: 20 INJECTION, SOLUTION INTRAVENOUS at 10:45

## 2023-07-06 ASSESSMENT — ACTIVITIES OF DAILY LIVING (ADL)
ADLS_ACUITY_SCORE: 20

## 2023-07-06 NOTE — PLAN OF CARE
Problem: Plan of Care - These are the overarching goals to be used throughout the patient stay.    Goal: Optimal Comfort and Wellbeing  Outcome: Progressing  Intervention: Provide Person-Centered Care  Recent Flowsheet Documentation  Taken 7/6/2023 0505 by Krystal Gaston RN  Trust Relationship/Rapport:   care explained   choices provided   questions answered   questions encouraged   thoughts/feelings acknowledged  Taken 7/5/2023 2000 by Krystal Gaston RN  Trust Relationship/Rapport:   care explained   choices provided   questions answered   questions encouraged   thoughts/feelings acknowledged   Goal Outcome Evaluation:    VSS, pain under controll with Tylenol and Ibuprofen Voiding adequately. Bleeding simiar to period, 1 medium clot reported. Pumped x1, interested in breast feeding but not exclusively. Declined lovenox until she can speak to MD about this.

## 2023-07-06 NOTE — LACTATION NOTE
This note was copied from a baby's chart.  Lactation consultant to patient room to assess breastfeeding. Mom had breast reduction surgery 10 years ago and states she thought she would just exclusively pump, but feels that is more work than breastfeeding so she wants support to try breastfeeding. States infant has breast fed 1x after delivery, but infant has been bottle fed human donor milk every feeding after than. Reviewed benefits of breastfeeding and possible lower milk supply or transfer after surgery. Instructed to supplement with colostrum and/or human donor after feedings if infant still acting hungry. Instructed to pump after most feedings for extra stimulation due to reduction. No history of low thyroid or infertility.      Reviewed benefit of skin to skin prior to feeding to waken and ready for feeding, importance of feeding baby on early hunger cues, and breastfeeding 8-12 times in 24 hours for adequate infant nutrition and hydration as well as for building an optimal milk supply. Instructed on importance of optimal infant positioning for deep, comfortable latch and effective milk transfer.Infant latch quickly with rhythmic sucking.  Reviewed techniques for keeping infant actively sucking, including breast compressions.    Anticipatory guidance given on input/output goals, normal feeding volumes in the  period and pumping for stimulation.     Answered questions and gave encouragement.

## 2023-07-06 NOTE — PLAN OF CARE
Problem: Plan of Care - These are the overarching goals to be used throughout the patient stay.    Goal: Plan of Care Review  Description: The Plan of Care Review/Shift note should be completed every shift.  The Outcome Evaluation is a brief statement about your assessment that the patient is improving, declining, or no change.  This information will be displayed automatically on your shift note.  7/6/2023 1658 by Loretta Wang RN  Outcome: Adequate for Care Transition  Patient meets postpartum criteria to be discharged. Independent in her own cares. Educated on prescriptions, warning signs, and follow-up appointment. Provided information on blood pressure warning signs and discharge paperwork. All questions and concerns answered at this time.

## 2023-07-06 NOTE — PROGRESS NOTES
"Vaginal Delivery Postpartum Day 1    Patient Name:  Susanna Fraser  :      1990  MRN:      [id number]      Assessment:  Normal postpartum course.  Anemia secondary to acute blood loss    Plan:  Continue current care.  Plan discharge later today if baby is able  1 dose of Venofer before discharge      Subjective:  The patient feels well:  Voiding without difficulty, lochia normal, tolerating normal diet, ambulating without difficulty and passing flatus. Voiding independently without complication. Pain is well controlled with current medications.  The patient has no emotional concerns.  The baby is well and being fed by breast.      YOB: 2023   Birth Time: 1:31 PM     Prenatal Complications include:   1. BMI 47  2. GHTN  3. Hx of depression; un-medicated and stable  4. Prominent fetal renal pelvis R 7.8, L 7.3  5. Failed IOL -7/3  6. Mild anemia       Objective:  /53 (BP Location: Right arm, Patient Position: Semi-Simmons's, Cuff Size: Adult Regular)   Pulse 76   Temp 98.2  F (36.8  C) (Oral)   Resp 17   Ht 1.727 m (5' 8\")   Wt (!) 153.3 kg (338 lb)   LMP  (LMP Unknown)   SpO2 99%   Breastfeeding Unknown   BMI 51.39 kg/m    Patient Vitals for the past 24 hrs:   BP Temp Temp src Pulse Resp SpO2   23 0852 116/53 98.2  F (36.8  C) Oral 76 17 99 %   23 0505 115/48 97.8  F (36.6  C) -- 70 18 98 %   23 0000 131/60 97.8  F (36.6  C) Oral 76 18 --   23 114/58 98  F (36.7  C) Oral 92 20 98 %   23 1556 129/61 98  F (36.7  C) Oral 93 16 --   23 1546 125/60 -- -- -- -- --   23 1545 125/60 -- -- -- -- --   23 1525 117/66 -- -- -- -- --   23 1510 113/56 -- -- -- -- --   23 1455 127/64 -- -- -- -- --   23 1444 129/66 -- -- -- -- --   23 1425 130/73 -- -- -- -- --   23 1410 118/61 -- -- -- -- --   23 1355 119/63 97.8  F (36.6  C) Oral 70 16 --   23 1215 -- -- -- -- 18 --   23 1214 121/55 98  F " (36.7  C) Oral 68 18 --   07/05/23 1207 -- -- -- -- 18 --   07/05/23 1059 -- -- -- -- 18 --   07/05/23 1050 118/63 97.7  F (36.5  C) Oral 75 18 --       Exam: Patient A&O x 3. No acute distress, breathing unlabored.The amount and color of the lochia is appropriate for the duration of recovery. Uterine fundus is firm at U-1.     Lab Results   Component Value Date    AS Negative 07/04/2023    HGB 8.2 (L) 07/06/2023         There is no immunization history on file for this patient.    Provider:  Jessa Andres CNM    Date:  7/6/2023  Time:  9:42 AM

## 2023-07-06 NOTE — PLAN OF CARE
Iron sucrose infusion complete (see MAR). Tolerated well. Vital signs WDL. Denies lightheadedness, dizziness, or headache. Fundus firm and light bleeding. IV site WDL. Patient denies pain during or after infusion at IV site and no signs of infiltration.  Will continue to monitor.

## 2023-07-06 NOTE — DISCHARGE INSTRUCTIONS
Warning Signs after Having a Baby    Keep this paper on your fridge or somewhere else where you can see it.    Call your provider if you have any of these symptoms up to 12 weeks after having your baby.    Thoughts of hurting yourself or your baby  Pain in your chest or trouble breathing  Severe headache not helped by pain medicine  Eyesight concerns (blurry vision, seeing spots or flashes of light, other changes to eyesight)  Fainting, shaking or other signs of a seizure    Call 9-1-1 if you feel that it is an emergency.     The symptoms below can happen to anyone after giving birth. They can be very serious. Call your provider if you have any of these warning signs.    My provider s phone number: _______________________    Losing too much blood (hemorrhage)    Call your provider if you soak through a pad in less than an hour or pass blood clots bigger than a golf ball. These may be signs that you are bleeding too much.    Blood clots in the legs or lungs    After you give birth, your body naturally clots its blood to help prevent blood loss. Sometimes this increased clotting can happen in other areas of the body, like the legs or lungs. This can block your blood flow and be very dangerous.     Call your provider if you:  Have a red, swollen spot on the back of your leg that is warm or painful when you touch it.   Are coughing up blood.     Infection    Call your provider if you have any of these symptoms:  Fever of 100.4 F (38 C) or higher.  Pain or redness around your stitches if you had an incision.   Any yellow, white, or green fluid coming from places where you had stitches or surgery.    Mood Problems (postpartum depression)    Many people feel sad or have mood changes after having a baby. But for some people, these mood swings are worse.     Call your provider right away if you feel so anxious or nervous that you can't care for yourself or your baby.    Preeclampsia (high blood pressure)    Even if you  didn't have high blood pressure when you were pregnant, you are at risk for the high blood pressure disease called preeclampsia. This risk can last up to 12 weeks after giving birth.     Call your provider if you have:   Pain on your right side under your rib cage  Sudden swelling in the hands and face    Remember: You know your body. If something doesn't feel right, get medical help.     For informational purposes only. Not to replace the advice of your health care provider. Copyright 2020 [facility]. All rights reserved. Clinically reviewed by Lynette Hardy, RNC-OB, MSN. [employer] [id number] - Rev 02/23.

## 2023-07-06 NOTE — PLAN OF CARE
Problem: Plan of Care - These are the overarching goals to be used throughout the patient stay.    Goal: Plan of Care Review  Description: The Plan of Care Review/Shift note should be completed every shift.  The Outcome Evaluation is a brief statement about your assessment that the patient is improving, declining, or no change.  This information will be displayed automatically on your shift note.  Outcome: Progressing     Problem: Plan of Care - These are the overarching goals to be used throughout the patient stay.    Goal: Optimal Comfort and Wellbeing  Outcome: Progressing     Problem: Postpartum ( Delivery)  Goal: Fluid and Electrolyte Balance  Outcome: Progressing     Problem: Postpartum ( Delivery)  Goal: Effective Urinary Elimination  Outcome: Progressing     Vital signs stable and afebrile. Voiding frequently. Iron sucrose infusion completed this AM. Tolerated well.  Reports perineum pain. Utilizing karlee bottle, Tucks, Dermoplast, ice, and tub sitz bath as needed. Declines pain medication.   Lactation consulted to discuss benefits of breastfeeding and answer patient questions. Successful feeding at 1040. Audible sucks and swallows with encouragement. Plans to continue breastfeeding every 2-3 hours and pumping as needed.     Plans for discharge tonight 23 following 24 hour testing.

## 2023-07-06 NOTE — PROGRESS NOTES
[facility] RN Care Coordinator Note    Susanna Fraser  [id number]  1990    Chart reviewed, discharge follow-up plan discussed with patient's bedside RN, needs assessed. If stable, discharge planned for later this afternoon, after 's 24hr testing. BP check in clinic planned in 3- 5 days, and post-delivery follow-up appointment planned in 6 weeks, at [facility]. Home care nurse visit not ordered by discharging provider.    Patient is reported to have support at home and, if stable, would like to discharge later today with . RN Care Coordinator will continue to follow and assist with discharge planning as needed.

## 2023-07-06 NOTE — DISCHARGE SUMMARY
OB Discharge Summary      Date:  2023    Name:  Susanna Fraser  :  1990  MRN:  [id number]      Admission Date:  2023  Delivery Date: 2023   Gestational Age at Delivery:  40w1d  Discharge Date:  2023    Principal Diagnosis:    Patient Active Problem List   Diagnosis     Encounter for triage in pregnant patient     Encounter for induction of labor     PROM (premature rupture of membranes)     Gestational hypertension         Conditions complicating Pregnancy:   1. BMI 47  2. GHTN  3. Hx of depression; un-medicated and stable  4. Prominent fetal renal pelvis R 7.8, L 7.3  5. Failed IOL -7/3  6. Anemia secondary to acute blood loss     Indication for :  NA  Indication for Induction:  NA     Condition at Discharge:  stable    Discharge Medications:      Review of your medicines      START taking      Dose / Directions   docusate sodium 100 MG capsule  Commonly known as: COLACE  Used for:  (normal spontaneous vaginal delivery)      Dose: 100-200 mg  Take 1-2 capsules (100-200 mg) by mouth 2 times daily as needed for constipation  Quantity: 30 capsule  Refills: 0     ferrous sulfate 325 (65 Fe) MG tablet  Commonly known as: FEROSUL  Used for: Anemia due to blood loss, acute      Dose: 325 mg  Take 1 tablet (325 mg) by mouth daily (with breakfast)  Quantity: 30 tablet  Refills: 1     ibuprofen 800 MG tablet  Commonly known as: ADVIL/MOTRIN  Used for:  (normal spontaneous vaginal delivery)      Dose: 800 mg  Take 1 tablet (800 mg) by mouth every 8 hours as needed for other (cramping)  Quantity: 30 tablet  Refills: 0        CONTINUE these medicines which have NOT CHANGED      Dose / Directions   prenatal multivitamin w/iron 27-0.8 MG tablet  Indication: Pregnancy      Dose: 1 tablet  Take 1 tablet by mouth daily  Refills: 0           Where to get your medicines      These medications were sent to [id number] - [place] [id number] [facility]  & [address] [facility] [address]    Phone: [phone]     docusate sodium 100 MG capsule    ferrous sulfate 325 (65 Fe) MG tablet    ibuprofen 800 MG tablet          Discharge Plan:    Follow up with /CHRISTIANO:  3-5 days with MD then routine 6 week postpartum   Patient Instructions:      Physical activity: as tolerated    Diet:  As tolerated    Medication: see list    Other:        Physician/CNM: Jessa Andres CNM    Name:  Susanna Fraser  :  1990  MRN:  [id number]

## 2024-07-28 ENCOUNTER — HEALTH MAINTENANCE LETTER (OUTPATIENT)
Age: 34
End: 2024-07-28

## 2024-08-08 ENCOUNTER — TRANSFERRED RECORDS (OUTPATIENT)
Dept: HEALTH INFORMATION MANAGEMENT | Facility: CLINIC | Age: 34
End: 2024-08-08

## 2024-09-03 ENCOUNTER — TRANSFERRED RECORDS (OUTPATIENT)
Dept: HEALTH INFORMATION MANAGEMENT | Facility: CLINIC | Age: 34
End: 2024-09-03

## 2024-09-04 ENCOUNTER — TRANSFERRED RECORDS (OUTPATIENT)
Dept: HEALTH INFORMATION MANAGEMENT | Facility: CLINIC | Age: 34
End: 2024-09-04

## 2024-09-04 ENCOUNTER — MEDICAL CORRESPONDENCE (OUTPATIENT)
Dept: HEALTH INFORMATION MANAGEMENT | Facility: CLINIC | Age: 34
End: 2024-09-04

## 2024-09-06 ENCOUNTER — TRANSCRIBE ORDERS (OUTPATIENT)
Dept: MATERNAL FETAL MEDICINE | Facility: CLINIC | Age: 34
End: 2024-09-06

## 2024-09-06 DIAGNOSIS — O26.90 PREGNANCY RELATED CONDITION, ANTEPARTUM: Primary | ICD-10-CM

## 2024-10-01 ENCOUNTER — PRE VISIT (OUTPATIENT)
Dept: MATERNAL FETAL MEDICINE | Facility: HOSPITAL | Age: 34
End: 2024-10-01

## 2024-10-03 ENCOUNTER — OFFICE VISIT (OUTPATIENT)
Dept: MATERNAL FETAL MEDICINE | Facility: HOSPITAL | Age: 34
End: 2024-10-03
Attending: OBSTETRICS & GYNECOLOGY

## 2024-10-03 ENCOUNTER — ANCILLARY PROCEDURE (OUTPATIENT)
Dept: ULTRASOUND IMAGING | Facility: HOSPITAL | Age: 34
End: 2024-10-03
Attending: OBSTETRICS & GYNECOLOGY
Payer: COMMERCIAL

## 2024-10-03 DIAGNOSIS — O99.212 MATERNAL OBESITY SYNDROME, ANTEPARTUM, SECOND TRIMESTER: Primary | ICD-10-CM

## 2024-10-03 DIAGNOSIS — O26.90 PREGNANCY RELATED CONDITION, ANTEPARTUM: ICD-10-CM

## 2024-10-03 PROCEDURE — 99214 OFFICE O/P EST MOD 30 MIN: CPT | Mod: 25 | Performed by: OBSTETRICS & GYNECOLOGY

## 2024-10-03 PROCEDURE — 76811 OB US DETAILED SNGL FETUS: CPT | Mod: 26 | Performed by: OBSTETRICS & GYNECOLOGY

## 2024-10-03 PROCEDURE — 76811 OB US DETAILED SNGL FETUS: CPT

## 2024-10-03 PROCEDURE — 99212 OFFICE O/P EST SF 10 MIN: CPT | Performed by: OBSTETRICS & GYNECOLOGY

## 2024-10-03 NOTE — PROGRESS NOTES
The patient was seen for an ultrasound in the [facility] at the [facility] today.  For a detailed report of the ultrasound examination, please see the ultrasound report which can be found under the imaging tab.    If you have questions regarding today's evaluation or if we can be of further service, please contact the [facility] Center.    Sunita Frias MD  , OB/GYN  Maternal-Fetal Medicine  [phone] (Pager)

## 2024-10-03 NOTE — NURSING NOTE
Susanna Fraser is a  at 20w3d who presents to [facility] for scheduled L2. SBAR given to Dr. Frias, see note in Epic.

## 2024-10-25 ENCOUNTER — OFFICE VISIT (OUTPATIENT)
Dept: MATERNAL FETAL MEDICINE | Facility: HOSPITAL | Age: 34
End: 2024-10-25
Attending: OBSTETRICS & GYNECOLOGY

## 2024-10-25 ENCOUNTER — ANCILLARY PROCEDURE (OUTPATIENT)
Dept: ULTRASOUND IMAGING | Facility: HOSPITAL | Age: 34
End: 2024-10-25
Attending: OBSTETRICS & GYNECOLOGY
Payer: COMMERCIAL

## 2024-10-25 DIAGNOSIS — Z36.2 ENCOUNTER FOR FOLLOW-UP ULTRASOUND OF FETAL ANATOMY: ICD-10-CM

## 2024-10-25 DIAGNOSIS — O99.212 MATERNAL OBESITY SYNDROME, ANTEPARTUM, SECOND TRIMESTER: ICD-10-CM

## 2024-10-25 DIAGNOSIS — O99.212 MATERNAL OBESITY SYNDROME, ANTEPARTUM, SECOND TRIMESTER: Primary | ICD-10-CM

## 2024-10-25 PROCEDURE — 76816 OB US FOLLOW-UP PER FETUS: CPT | Mod: 26 | Performed by: OBSTETRICS & GYNECOLOGY

## 2024-10-25 PROCEDURE — 76816 OB US FOLLOW-UP PER FETUS: CPT

## 2024-10-25 PROCEDURE — 99207 PR NO CHARGE LOS: CPT | Performed by: OBSTETRICS & GYNECOLOGY

## 2024-10-25 NOTE — NURSING NOTE
Susanna Fraser is a  at 23w4d who presents to [facility] for scheduled follow up ultrasound. SBAR given to Dr. Aldridge, see note in Epic.

## 2024-10-25 NOTE — PROGRESS NOTES
Please see the imaging tab for details of the ultrasound performed today.    Edilma Aldridge MD  Specialist in Maternal-Fetal Medicine

## 2024-11-26 ENCOUNTER — ANCILLARY PROCEDURE (OUTPATIENT)
Dept: ULTRASOUND IMAGING | Facility: HOSPITAL | Age: 34
End: 2024-11-26
Attending: OBSTETRICS & GYNECOLOGY

## 2024-11-26 ENCOUNTER — OFFICE VISIT (OUTPATIENT)
Dept: MATERNAL FETAL MEDICINE | Facility: HOSPITAL | Age: 34
End: 2024-11-26
Attending: OBSTETRICS & GYNECOLOGY

## 2024-11-26 DIAGNOSIS — O99.212 MATERNAL OBESITY SYNDROME, ANTEPARTUM, SECOND TRIMESTER: ICD-10-CM

## 2024-11-26 DIAGNOSIS — Z36.2 ENCOUNTER FOR FOLLOW-UP ULTRASOUND OF FETAL ANATOMY: ICD-10-CM

## 2024-11-26 DIAGNOSIS — O99.210 OBESITY DURING PREGNANCY: Primary | ICD-10-CM

## 2024-11-26 PROCEDURE — 76816 OB US FOLLOW-UP PER FETUS: CPT

## 2024-11-26 PROCEDURE — 76816 OB US FOLLOW-UP PER FETUS: CPT | Mod: 26 | Performed by: OBSTETRICS & GYNECOLOGY

## 2024-11-26 PROCEDURE — 99213 OFFICE O/P EST LOW 20 MIN: CPT | Mod: 25 | Performed by: OBSTETRICS & GYNECOLOGY

## 2024-11-26 NOTE — NURSING NOTE
Susanna Fraser is a  at 28w1d who presents to [facility] for follow up growth ultrasound. Pt reports positive fetal movement. Pt denies bldg/lof/change in discharge, contractions, headache, vision changes, chest pain/SOB or edema. SBAR given to Dr. Hernandez, see note in Epic.

## 2024-11-26 NOTE — PROGRESS NOTES
"Please see \"Imaging\" tab under \"Chart Review\" for details of today's visit.    Dang Hernandez    "

## 2025-04-27 ENCOUNTER — HEALTH MAINTENANCE LETTER (OUTPATIENT)
Age: 35
End: 2025-04-27